# Patient Record
Sex: MALE | Race: WHITE | Employment: OTHER
[De-identification: names, ages, dates, MRNs, and addresses within clinical notes are randomized per-mention and may not be internally consistent; named-entity substitution may affect disease eponyms.]

---

## 2017-01-19 ENCOUNTER — OFFICE VISIT (OUTPATIENT)
Facility: CLINIC | Age: 77
End: 2017-01-19

## 2017-01-19 VITALS
TEMPERATURE: 97.8 F | HEIGHT: 70 IN | BODY MASS INDEX: 27.35 KG/M2 | DIASTOLIC BLOOD PRESSURE: 72 MMHG | SYSTOLIC BLOOD PRESSURE: 130 MMHG | OXYGEN SATURATION: 98 % | HEART RATE: 58 BPM | WEIGHT: 191 LBS

## 2017-01-19 DIAGNOSIS — F41.9 ANXIETY: ICD-10-CM

## 2017-01-19 DIAGNOSIS — E11.9 TYPE 2 DIABETES MELLITUS WITHOUT COMPLICATION, WITHOUT LONG-TERM CURRENT USE OF INSULIN (HCC): Primary | ICD-10-CM

## 2017-01-19 DIAGNOSIS — L30.9 DERMATITIS: ICD-10-CM

## 2017-01-19 DIAGNOSIS — Z23 NEED FOR INFLUENZA VACCINATION: ICD-10-CM

## 2017-01-19 DIAGNOSIS — Z23 NEED FOR PNEUMOCOCCAL VACCINATION: ICD-10-CM

## 2017-01-19 DIAGNOSIS — F32.A DEPRESSION, UNSPECIFIED DEPRESSION TYPE: ICD-10-CM

## 2017-01-19 DIAGNOSIS — E78.5 HYPERLIPIDEMIA, UNSPECIFIED HYPERLIPIDEMIA TYPE: ICD-10-CM

## 2017-01-19 DIAGNOSIS — I10 ESSENTIAL HYPERTENSION: ICD-10-CM

## 2017-01-19 PROCEDURE — 90662 IIV NO PRSV INCREASED AG IM: CPT | Performed by: PHYSICIAN ASSISTANT

## 2017-01-19 PROCEDURE — 90670 PCV13 VACCINE IM: CPT | Performed by: PHYSICIAN ASSISTANT

## 2017-01-19 PROCEDURE — G8484 FLU IMMUNIZE NO ADMIN: HCPCS | Performed by: PHYSICIAN ASSISTANT

## 2017-01-19 PROCEDURE — G0009 ADMIN PNEUMOCOCCAL VACCINE: HCPCS | Performed by: PHYSICIAN ASSISTANT

## 2017-01-19 PROCEDURE — 1123F ACP DISCUSS/DSCN MKR DOCD: CPT | Performed by: PHYSICIAN ASSISTANT

## 2017-01-19 PROCEDURE — 4040F PNEUMOC VAC/ADMIN/RCVD: CPT | Performed by: PHYSICIAN ASSISTANT

## 2017-01-19 PROCEDURE — 3017F COLORECTAL CA SCREEN DOC REV: CPT | Performed by: PHYSICIAN ASSISTANT

## 2017-01-19 PROCEDURE — G8420 CALC BMI NORM PARAMETERS: HCPCS | Performed by: PHYSICIAN ASSISTANT

## 2017-01-19 PROCEDURE — 99214 OFFICE O/P EST MOD 30 MIN: CPT | Performed by: PHYSICIAN ASSISTANT

## 2017-01-19 PROCEDURE — G0008 ADMIN INFLUENZA VIRUS VAC: HCPCS | Performed by: PHYSICIAN ASSISTANT

## 2017-01-19 PROCEDURE — G8427 DOCREV CUR MEDS BY ELIG CLIN: HCPCS | Performed by: PHYSICIAN ASSISTANT

## 2017-01-19 PROCEDURE — 1036F TOBACCO NON-USER: CPT | Performed by: PHYSICIAN ASSISTANT

## 2017-01-19 PROCEDURE — 3044F HG A1C LEVEL LT 7.0%: CPT | Performed by: PHYSICIAN ASSISTANT

## 2017-01-19 RX ORDER — ATENOLOL 50 MG/1
50 TABLET ORAL DAILY
Qty: 90 TABLET | Refills: 1 | Status: SHIPPED | OUTPATIENT
Start: 2017-01-19 | End: 2017-03-10 | Stop reason: SDUPTHER

## 2017-01-19 RX ORDER — LISINOPRIL 40 MG/1
40 TABLET ORAL DAILY
Qty: 90 TABLET | Refills: 1 | Status: SHIPPED | OUTPATIENT
Start: 2017-01-19 | End: 2017-04-21 | Stop reason: SDUPTHER

## 2017-01-19 RX ORDER — CLOTRIMAZOLE AND BETAMETHASONE DIPROPIONATE 10; .64 MG/G; MG/G
CREAM TOPICAL
Qty: 45 G | Refills: 0 | Status: SHIPPED | OUTPATIENT
Start: 2017-01-19 | End: 2017-11-30

## 2017-01-19 RX ORDER — METFORMIN HYDROCHLORIDE 500 MG/1
500 TABLET, EXTENDED RELEASE ORAL
Qty: 90 TABLET | Refills: 1 | Status: SHIPPED | OUTPATIENT
Start: 2017-01-19 | End: 2017-03-10 | Stop reason: SDUPTHER

## 2017-01-19 ASSESSMENT — ENCOUNTER SYMPTOMS
CHEST TIGHTNESS: 0
BLOOD IN STOOL: 0
SHORTNESS OF BREATH: 0
DIARRHEA: 0
BACK PAIN: 0
ABDOMINAL PAIN: 0
NAUSEA: 0
PHOTOPHOBIA: 0
VOMITING: 0
CONSTIPATION: 0
COUGH: 0

## 2017-01-25 ENCOUNTER — TELEPHONE (OUTPATIENT)
Facility: CLINIC | Age: 77
End: 2017-01-25

## 2017-01-25 DIAGNOSIS — L30.9 DERMATITIS: Primary | ICD-10-CM

## 2017-01-25 RX ORDER — CLOBETASOL PROPIONATE 0.5 MG/G
OINTMENT TOPICAL
Qty: 30 G | Refills: 0 | Status: SHIPPED | OUTPATIENT
Start: 2017-01-25 | End: 2017-11-30

## 2017-02-09 ENCOUNTER — TELEPHONE (OUTPATIENT)
Facility: CLINIC | Age: 77
End: 2017-02-09

## 2017-03-01 ENCOUNTER — TELEPHONE (OUTPATIENT)
Facility: CLINIC | Age: 77
End: 2017-03-01

## 2017-03-01 DIAGNOSIS — E11.9 TYPE 2 DIABETES MELLITUS WITHOUT COMPLICATION, WITHOUT LONG-TERM CURRENT USE OF INSULIN (HCC): Primary | ICD-10-CM

## 2017-03-01 RX ORDER — GLUCOSAMINE HCL/CHONDROITIN SU 500-400 MG
CAPSULE ORAL
Qty: 200 STRIP | Refills: 3 | Status: SHIPPED | OUTPATIENT
Start: 2017-03-01 | End: 2018-06-12 | Stop reason: SDUPTHER

## 2017-03-01 RX ORDER — LANCETS 30 GAUGE
EACH MISCELLANEOUS
Qty: 200 EACH | Refills: 3 | Status: SHIPPED | OUTPATIENT
Start: 2017-03-01 | End: 2018-06-12 | Stop reason: SDUPTHER

## 2017-03-10 DIAGNOSIS — I10 ESSENTIAL HYPERTENSION: ICD-10-CM

## 2017-03-10 DIAGNOSIS — E11.9 TYPE 2 DIABETES MELLITUS WITHOUT COMPLICATION, WITHOUT LONG-TERM CURRENT USE OF INSULIN (HCC): ICD-10-CM

## 2017-03-10 RX ORDER — METFORMIN HYDROCHLORIDE 500 MG/1
500 TABLET, EXTENDED RELEASE ORAL 2 TIMES DAILY WITH MEALS
Qty: 180 TABLET | Refills: 1 | Status: SHIPPED | OUTPATIENT
Start: 2017-03-10 | End: 2017-05-30 | Stop reason: DRUGHIGH

## 2017-03-10 RX ORDER — ATENOLOL 50 MG/1
50 TABLET ORAL DAILY
Qty: 90 TABLET | Refills: 1 | Status: SHIPPED | OUTPATIENT
Start: 2017-03-10 | End: 2017-08-21 | Stop reason: SDUPTHER

## 2017-05-26 RX ORDER — ATORVASTATIN CALCIUM 10 MG/1
TABLET, FILM COATED ORAL
Qty: 90 TABLET | Refills: 0 | Status: SHIPPED | OUTPATIENT
Start: 2017-05-26 | End: 2017-08-21 | Stop reason: SDUPTHER

## 2017-05-30 ENCOUNTER — HOSPITAL ENCOUNTER (OUTPATIENT)
Age: 77
Setting detail: SPECIMEN
Discharge: HOME | End: 2017-05-30
Payer: MEDICARE

## 2017-05-30 DIAGNOSIS — E11.9 TYPE 2 DIABETES MELLITUS WITHOUT COMPLICATION, WITHOUT LONG-TERM CURRENT USE OF INSULIN (HCC): ICD-10-CM

## 2017-05-30 DIAGNOSIS — Z00.00 ROUTINE GENERAL MEDICAL EXAMINATION AT A HEALTH CARE FACILITY: ICD-10-CM

## 2017-05-30 LAB
CREATININE URINE: 58.3 MG/DL (ref 39–259)
MICROALBUMIN/CREAT 24H UR: <12 MG/L
MICROALBUMIN/CREAT UR-RTO: 21 MCG/MG CREAT

## 2017-06-15 ENCOUNTER — HOSPITAL ENCOUNTER (OUTPATIENT)
Age: 77
Discharge: HOME | End: 2017-06-15
Payer: MEDICARE

## 2017-06-15 DIAGNOSIS — E11.9 TYPE 2 DIABETES MELLITUS WITHOUT COMPLICATION, WITHOUT LONG-TERM CURRENT USE OF INSULIN (HCC): ICD-10-CM

## 2017-06-15 DIAGNOSIS — Z13.6 SCREENING FOR CARDIOVASCULAR CONDITION: ICD-10-CM

## 2017-06-15 DIAGNOSIS — Z12.5 SCREENING FOR PROSTATE CANCER: ICD-10-CM

## 2017-06-15 DIAGNOSIS — Z00.00 ROUTINE GENERAL MEDICAL EXAMINATION AT A HEALTH CARE FACILITY: ICD-10-CM

## 2017-06-15 LAB
CHOLESTEROL/HDL RATIO: 3.4
CHOLESTEROL: 175 MG/DL
GLUCOSE BLD-MCNC: 92 MG/DL (ref 70–99)
HDLC SERPL-MCNC: 52 MG/DL
LDL CHOLESTEROL: 106 MG/DL (ref 0–130)
PROSTATE SPECIFIC ANTIGEN: 1.19 UG/L
TRIGL SERPL-MCNC: 86 MG/DL
VLDLC SERPL CALC-MCNC: NORMAL MG/DL (ref 1–30)

## 2017-06-15 PROCEDURE — 36415 COLL VENOUS BLD VENIPUNCTURE: CPT

## 2017-06-15 PROCEDURE — 82947 ASSAY GLUCOSE BLOOD QUANT: CPT

## 2017-06-15 PROCEDURE — 80061 LIPID PANEL: CPT

## 2017-06-15 PROCEDURE — G0103 PSA SCREENING: HCPCS

## 2018-06-12 ENCOUNTER — HOSPITAL ENCOUNTER (OUTPATIENT)
Age: 78
Setting detail: SPECIMEN
Discharge: HOME | End: 2018-06-12
Payer: MEDICARE

## 2018-06-12 DIAGNOSIS — E11.9 TYPE 2 DIABETES MELLITUS WITHOUT COMPLICATION, WITHOUT LONG-TERM CURRENT USE OF INSULIN (HCC): ICD-10-CM

## 2018-06-12 LAB
CREATININE URINE: 90.3 MG/DL (ref 39–259)
MICROALBUMIN/CREAT 24H UR: <12 MG/L
MICROALBUMIN/CREAT UR-RTO: NORMAL MCG/MG CREAT

## 2018-09-18 ENCOUNTER — HOSPITAL ENCOUNTER (OUTPATIENT)
Age: 78
Discharge: HOME | End: 2018-09-18
Payer: MEDICARE

## 2018-09-18 DIAGNOSIS — E78.5 HYPERLIPIDEMIA, UNSPECIFIED HYPERLIPIDEMIA TYPE: ICD-10-CM

## 2018-09-18 DIAGNOSIS — E11.9 TYPE 2 DIABETES MELLITUS WITHOUT COMPLICATION, WITHOUT LONG-TERM CURRENT USE OF INSULIN (HCC): ICD-10-CM

## 2018-09-18 DIAGNOSIS — Z12.5 PROSTATE CANCER SCREENING: ICD-10-CM

## 2018-09-18 DIAGNOSIS — I10 ESSENTIAL HYPERTENSION: ICD-10-CM

## 2018-09-18 LAB
ALBUMIN SERPL-MCNC: 4.7 G/DL (ref 3.5–5.2)
ALBUMIN/GLOBULIN RATIO: ABNORMAL (ref 1–2.5)
ALP BLD-CCNC: 62 U/L (ref 40–129)
ALT SERPL-CCNC: 22 U/L (ref 5–41)
ANION GAP SERPL CALCULATED.3IONS-SCNC: 11 MMOL/L (ref 9–17)
AST SERPL-CCNC: 18 U/L
BILIRUB SERPL-MCNC: 0.44 MG/DL (ref 0.3–1.2)
BUN BLDV-MCNC: 20 MG/DL (ref 8–23)
BUN/CREAT BLD: ABNORMAL (ref 9–20)
CALCIUM SERPL-MCNC: 10 MG/DL (ref 8.6–10.4)
CHLORIDE BLD-SCNC: 105 MMOL/L (ref 98–107)
CHOLESTEROL/HDL RATIO: 3.5
CHOLESTEROL: 158 MG/DL
CO2: 26 MMOL/L (ref 20–31)
CREAT SERPL-MCNC: 0.99 MG/DL (ref 0.7–1.2)
GFR AFRICAN AMERICAN: >60 ML/MIN
GFR NON-AFRICAN AMERICAN: >60 ML/MIN
GFR SERPL CREATININE-BSD FRML MDRD: ABNORMAL ML/MIN/{1.73_M2}
GFR SERPL CREATININE-BSD FRML MDRD: ABNORMAL ML/MIN/{1.73_M2}
GLUCOSE BLD-MCNC: 108 MG/DL (ref 70–99)
HCT VFR BLD CALC: 42.2 % (ref 41–53)
HDLC SERPL-MCNC: 45 MG/DL
HEMOGLOBIN: 14.3 G/DL (ref 13.5–17.5)
LDL CHOLESTEROL: 93 MG/DL (ref 0–130)
MCH RBC QN AUTO: 29.5 PG (ref 26–34)
MCHC RBC AUTO-ENTMCNC: 33.9 G/DL (ref 31–37)
MCV RBC AUTO: 86.8 FL (ref 80–100)
NRBC AUTOMATED: NORMAL PER 100 WBC
PDW BLD-RTO: 14.4 % (ref 11.5–14.9)
PLATELET # BLD: 197 K/UL (ref 150–450)
PMV BLD AUTO: 8.2 FL (ref 6–12)
POTASSIUM SERPL-SCNC: 5.4 MMOL/L (ref 3.7–5.3)
PROSTATE SPECIFIC ANTIGEN: 1.46 UG/L
RBC # BLD: 4.86 M/UL (ref 4.5–5.9)
SODIUM BLD-SCNC: 142 MMOL/L (ref 135–144)
TOTAL PROTEIN: 7.3 G/DL (ref 6.4–8.3)
TRIGL SERPL-MCNC: 102 MG/DL
VLDLC SERPL CALC-MCNC: NORMAL MG/DL (ref 1–30)
WBC # BLD: 8.2 K/UL (ref 3.5–11)

## 2018-09-18 PROCEDURE — 36415 COLL VENOUS BLD VENIPUNCTURE: CPT

## 2018-09-18 PROCEDURE — G0103 PSA SCREENING: HCPCS

## 2018-09-18 PROCEDURE — 85027 COMPLETE CBC AUTOMATED: CPT

## 2018-09-18 PROCEDURE — 80053 COMPREHEN METABOLIC PANEL: CPT

## 2018-09-18 PROCEDURE — 80061 LIPID PANEL: CPT

## 2019-06-13 ENCOUNTER — HOSPITAL ENCOUNTER (OUTPATIENT)
Age: 79
Setting detail: SPECIMEN
Discharge: HOME | End: 2019-06-13
Payer: MEDICARE

## 2019-06-13 DIAGNOSIS — E11.9 TYPE 2 DIABETES MELLITUS WITHOUT COMPLICATION, WITHOUT LONG-TERM CURRENT USE OF INSULIN (HCC): ICD-10-CM

## 2019-06-14 LAB
CREATININE URINE: 78.5 MG/DL (ref 39–259)
MICROALBUMIN/CREAT 24H UR: <12 MG/L
MICROALBUMIN/CREAT UR-RTO: NORMAL MCG/MG CREAT

## 2019-09-13 ENCOUNTER — HOSPITAL ENCOUNTER (OUTPATIENT)
Age: 79
Discharge: HOME | End: 2019-09-13
Payer: MEDICARE

## 2019-09-13 DIAGNOSIS — I10 ESSENTIAL HYPERTENSION: ICD-10-CM

## 2019-09-13 DIAGNOSIS — E78.5 HYPERLIPIDEMIA, UNSPECIFIED HYPERLIPIDEMIA TYPE: ICD-10-CM

## 2019-09-13 DIAGNOSIS — E11.9 TYPE 2 DIABETES MELLITUS WITHOUT COMPLICATION, WITHOUT LONG-TERM CURRENT USE OF INSULIN (HCC): ICD-10-CM

## 2019-09-13 DIAGNOSIS — E55.9 VITAMIN D INSUFFICIENCY: ICD-10-CM

## 2019-09-13 DIAGNOSIS — F41.9 ANXIETY: ICD-10-CM

## 2019-09-13 LAB
ABSOLUTE EOS #: 0.4 K/UL (ref 0–0.4)
ABSOLUTE IMMATURE GRANULOCYTE: ABNORMAL K/UL (ref 0–0.3)
ABSOLUTE LYMPH #: 2.6 K/UL (ref 1–4.8)
ABSOLUTE MONO #: 0.6 K/UL (ref 0.1–1.3)
ALBUMIN SERPL-MCNC: 4.7 G/DL (ref 3.5–5.2)
ALBUMIN/GLOBULIN RATIO: ABNORMAL (ref 1–2.5)
ALP BLD-CCNC: 69 U/L (ref 40–129)
ALT SERPL-CCNC: 20 U/L (ref 5–41)
ANION GAP SERPL CALCULATED.3IONS-SCNC: 12 MMOL/L (ref 9–17)
AST SERPL-CCNC: 16 U/L
BASOPHILS # BLD: 1 % (ref 0–2)
BASOPHILS ABSOLUTE: 0.1 K/UL (ref 0–0.2)
BILIRUB SERPL-MCNC: 0.48 MG/DL (ref 0.3–1.2)
BUN BLDV-MCNC: 24 MG/DL (ref 8–23)
BUN/CREAT BLD: ABNORMAL (ref 9–20)
CALCIUM SERPL-MCNC: 10.3 MG/DL (ref 8.6–10.4)
CHLORIDE BLD-SCNC: 102 MMOL/L (ref 98–107)
CHOLESTEROL/HDL RATIO: 3.6
CHOLESTEROL: 156 MG/DL
CO2: 25 MMOL/L (ref 20–31)
CREAT SERPL-MCNC: 0.96 MG/DL (ref 0.7–1.2)
DIFFERENTIAL TYPE: ABNORMAL
EOSINOPHILS RELATIVE PERCENT: 5 % (ref 0–4)
GFR AFRICAN AMERICAN: >60 ML/MIN
GFR NON-AFRICAN AMERICAN: >60 ML/MIN
GFR SERPL CREATININE-BSD FRML MDRD: ABNORMAL ML/MIN/{1.73_M2}
GFR SERPL CREATININE-BSD FRML MDRD: ABNORMAL ML/MIN/{1.73_M2}
GLUCOSE BLD-MCNC: 127 MG/DL (ref 70–99)
HCT VFR BLD CALC: 42.4 % (ref 41–53)
HDLC SERPL-MCNC: 43 MG/DL
HEMOGLOBIN: 14.4 G/DL (ref 13.5–17.5)
IMMATURE GRANULOCYTES: ABNORMAL %
LDL CHOLESTEROL: 92 MG/DL (ref 0–130)
LYMPHOCYTES # BLD: 29 % (ref 24–44)
MCH RBC QN AUTO: 29.7 PG (ref 26–34)
MCHC RBC AUTO-ENTMCNC: 34.1 G/DL (ref 31–37)
MCV RBC AUTO: 87.3 FL (ref 80–100)
MONOCYTES # BLD: 7 % (ref 1–7)
NRBC AUTOMATED: ABNORMAL PER 100 WBC
PDW BLD-RTO: 13.6 % (ref 11.5–14.9)
PLATELET # BLD: 217 K/UL (ref 150–450)
PLATELET ESTIMATE: ABNORMAL
PMV BLD AUTO: 8.3 FL (ref 6–12)
POTASSIUM SERPL-SCNC: 4.5 MMOL/L (ref 3.7–5.3)
RBC # BLD: 4.86 M/UL (ref 4.5–5.9)
RBC # BLD: ABNORMAL 10*6/UL
SEG NEUTROPHILS: 58 % (ref 36–66)
SEGMENTED NEUTROPHILS ABSOLUTE COUNT: 5.2 K/UL (ref 1.3–9.1)
SODIUM BLD-SCNC: 139 MMOL/L (ref 135–144)
TOTAL PROTEIN: 7.6 G/DL (ref 6.4–8.3)
TRIGL SERPL-MCNC: 103 MG/DL
VITAMIN D 25-HYDROXY: 39.9 NG/ML (ref 30–100)
VLDLC SERPL CALC-MCNC: NORMAL MG/DL (ref 1–30)
WBC # BLD: 9 K/UL (ref 3.5–11)
WBC # BLD: ABNORMAL 10*3/UL

## 2019-09-13 PROCEDURE — 80061 LIPID PANEL: CPT

## 2019-09-13 PROCEDURE — 36415 COLL VENOUS BLD VENIPUNCTURE: CPT

## 2019-09-13 PROCEDURE — 85025 COMPLETE CBC W/AUTO DIFF WBC: CPT

## 2019-09-13 PROCEDURE — 80053 COMPREHEN METABOLIC PANEL: CPT

## 2019-09-13 PROCEDURE — 82306 VITAMIN D 25 HYDROXY: CPT

## 2019-12-24 ENCOUNTER — HOSPITAL ENCOUNTER (OUTPATIENT)
Age: 79
Discharge: HOME | End: 2019-12-26
Payer: MEDICARE

## 2019-12-24 ENCOUNTER — HOSPITAL ENCOUNTER (OUTPATIENT)
Dept: GENERAL RADIOLOGY | Age: 79
Discharge: HOME | End: 2019-12-26
Payer: MEDICARE

## 2019-12-24 DIAGNOSIS — M25.432 PAIN AND SWELLING OF LEFT WRIST: ICD-10-CM

## 2019-12-24 DIAGNOSIS — M25.532 PAIN AND SWELLING OF LEFT WRIST: ICD-10-CM

## 2019-12-24 PROCEDURE — 73110 X-RAY EXAM OF WRIST: CPT

## 2020-09-29 ENCOUNTER — HOSPITAL ENCOUNTER (OUTPATIENT)
Age: 80
Discharge: HOME | End: 2020-09-29
Payer: MEDICARE

## 2020-09-29 LAB
ABSOLUTE EOS #: 0.4 K/UL (ref 0–0.4)
ABSOLUTE IMMATURE GRANULOCYTE: ABNORMAL K/UL (ref 0–0.3)
ABSOLUTE LYMPH #: 2.3 K/UL (ref 1–4.8)
ABSOLUTE MONO #: 0.5 K/UL (ref 0.1–1.3)
ALBUMIN SERPL-MCNC: 4.6 G/DL (ref 3.5–5.2)
ALBUMIN/GLOBULIN RATIO: ABNORMAL (ref 1–2.5)
ALP BLD-CCNC: 68 U/L (ref 40–129)
ALT SERPL-CCNC: 20 U/L (ref 5–41)
ANION GAP SERPL CALCULATED.3IONS-SCNC: 10 MMOL/L (ref 9–17)
AST SERPL-CCNC: 18 U/L
BASOPHILS # BLD: 1 % (ref 0–2)
BASOPHILS ABSOLUTE: 0.1 K/UL (ref 0–0.2)
BILIRUB SERPL-MCNC: 0.46 MG/DL (ref 0.3–1.2)
BUN BLDV-MCNC: 18 MG/DL (ref 8–23)
BUN/CREAT BLD: ABNORMAL (ref 9–20)
CALCIUM SERPL-MCNC: 10.3 MG/DL (ref 8.6–10.4)
CHLORIDE BLD-SCNC: 107 MMOL/L (ref 98–107)
CHOLESTEROL/HDL RATIO: 3.7
CHOLESTEROL: 156 MG/DL
CO2: 26 MMOL/L (ref 20–31)
CREAT SERPL-MCNC: 0.87 MG/DL (ref 0.7–1.2)
DIFFERENTIAL TYPE: ABNORMAL
EOSINOPHILS RELATIVE PERCENT: 5 % (ref 0–4)
ESTIMATED AVERAGE GLUCOSE: 157 MG/DL
GFR AFRICAN AMERICAN: >60 ML/MIN
GFR NON-AFRICAN AMERICAN: >60 ML/MIN
GFR SERPL CREATININE-BSD FRML MDRD: ABNORMAL ML/MIN/{1.73_M2}
GFR SERPL CREATININE-BSD FRML MDRD: ABNORMAL ML/MIN/{1.73_M2}
GLUCOSE BLD-MCNC: 121 MG/DL (ref 70–99)
HBA1C MFR BLD: 7.1 % (ref 4–6)
HCT VFR BLD CALC: 44.4 % (ref 41–53)
HDLC SERPL-MCNC: 42 MG/DL
HEMOGLOBIN: 15 G/DL (ref 13.5–17.5)
IMMATURE GRANULOCYTES: ABNORMAL %
LDL CHOLESTEROL: 91 MG/DL (ref 0–130)
LYMPHOCYTES # BLD: 29 % (ref 24–44)
MCH RBC QN AUTO: 29.2 PG (ref 26–34)
MCHC RBC AUTO-ENTMCNC: 33.7 G/DL (ref 31–37)
MCV RBC AUTO: 86.4 FL (ref 80–100)
MONOCYTES # BLD: 7 % (ref 1–7)
NRBC AUTOMATED: ABNORMAL PER 100 WBC
PDW BLD-RTO: 14.1 % (ref 11.5–14.9)
PLATELET # BLD: 228 K/UL (ref 150–450)
PLATELET ESTIMATE: ABNORMAL
PMV BLD AUTO: 8.4 FL (ref 6–12)
POTASSIUM SERPL-SCNC: 4.8 MMOL/L (ref 3.7–5.3)
PROSTATE SPECIFIC ANTIGEN: 1.74 UG/L
RBC # BLD: 5.14 M/UL (ref 4.5–5.9)
RBC # BLD: ABNORMAL 10*6/UL
SEG NEUTROPHILS: 58 % (ref 36–66)
SEGMENTED NEUTROPHILS ABSOLUTE COUNT: 4.5 K/UL (ref 1.3–9.1)
SODIUM BLD-SCNC: 143 MMOL/L (ref 135–144)
TOTAL PROTEIN: 7.5 G/DL (ref 6.4–8.3)
TRIGL SERPL-MCNC: 115 MG/DL
VITAMIN D 25-HYDROXY: 36.7 NG/ML (ref 30–100)
VLDLC SERPL CALC-MCNC: NORMAL MG/DL (ref 1–30)
WBC # BLD: 7.8 K/UL (ref 3.5–11)
WBC # BLD: ABNORMAL 10*3/UL

## 2020-09-29 PROCEDURE — 85025 COMPLETE CBC W/AUTO DIFF WBC: CPT

## 2020-09-29 PROCEDURE — 83036 HEMOGLOBIN GLYCOSYLATED A1C: CPT

## 2020-09-29 PROCEDURE — 80053 COMPREHEN METABOLIC PANEL: CPT

## 2020-09-29 PROCEDURE — 80061 LIPID PANEL: CPT

## 2020-09-29 PROCEDURE — 82306 VITAMIN D 25 HYDROXY: CPT

## 2020-09-29 PROCEDURE — 36415 COLL VENOUS BLD VENIPUNCTURE: CPT

## 2020-09-29 PROCEDURE — G0103 PSA SCREENING: HCPCS

## 2021-03-10 PROBLEM — F33.0 MILD EPISODE OF RECURRENT MAJOR DEPRESSIVE DISORDER (HCC): Status: ACTIVE | Noted: 2021-03-10

## 2021-04-13 ENCOUNTER — HOSPITAL ENCOUNTER (OUTPATIENT)
Age: 81
Discharge: HOME | End: 2021-04-13
Payer: MEDICARE

## 2021-04-13 DIAGNOSIS — Z11.59 ENCOUNTER FOR HEPATITIS C SCREENING TEST FOR LOW RISK PATIENT: ICD-10-CM

## 2021-04-13 DIAGNOSIS — F33.0 MILD EPISODE OF RECURRENT MAJOR DEPRESSIVE DISORDER (HCC): ICD-10-CM

## 2021-04-13 DIAGNOSIS — E55.9 VITAMIN D INSUFFICIENCY: ICD-10-CM

## 2021-04-13 DIAGNOSIS — E78.5 HYPERLIPIDEMIA, UNSPECIFIED HYPERLIPIDEMIA TYPE: ICD-10-CM

## 2021-04-13 DIAGNOSIS — I10 ESSENTIAL HYPERTENSION: ICD-10-CM

## 2021-04-13 LAB
ABSOLUTE EOS #: 0.4 K/UL (ref 0–0.4)
ABSOLUTE IMMATURE GRANULOCYTE: ABNORMAL K/UL (ref 0–0.3)
ABSOLUTE LYMPH #: 2.6 K/UL (ref 1–4.8)
ABSOLUTE MONO #: 0.5 K/UL (ref 0.1–1.3)
ALBUMIN SERPL-MCNC: 4.7 G/DL (ref 3.5–5.2)
ALBUMIN/GLOBULIN RATIO: ABNORMAL (ref 1–2.5)
ALP BLD-CCNC: 69 U/L (ref 40–129)
ALT SERPL-CCNC: 21 U/L (ref 5–41)
ANION GAP SERPL CALCULATED.3IONS-SCNC: 8 MMOL/L (ref 9–17)
AST SERPL-CCNC: 20 U/L
BASOPHILS # BLD: 1 % (ref 0–2)
BASOPHILS ABSOLUTE: 0.1 K/UL (ref 0–0.2)
BILIRUB SERPL-MCNC: 0.46 MG/DL (ref 0.3–1.2)
BUN BLDV-MCNC: 20 MG/DL (ref 8–23)
BUN/CREAT BLD: ABNORMAL (ref 9–20)
CALCIUM SERPL-MCNC: 10.2 MG/DL (ref 8.6–10.4)
CHLORIDE BLD-SCNC: 106 MMOL/L (ref 98–107)
CHOLESTEROL, FASTING: 163 MG/DL
CHOLESTEROL/HDL RATIO: 3.5
CO2: 28 MMOL/L (ref 20–31)
CREAT SERPL-MCNC: 1.03 MG/DL (ref 0.7–1.2)
DIFFERENTIAL TYPE: ABNORMAL
EOSINOPHILS RELATIVE PERCENT: 5 % (ref 0–4)
GFR AFRICAN AMERICAN: >60 ML/MIN
GFR NON-AFRICAN AMERICAN: >60 ML/MIN
GFR SERPL CREATININE-BSD FRML MDRD: ABNORMAL ML/MIN/{1.73_M2}
GFR SERPL CREATININE-BSD FRML MDRD: ABNORMAL ML/MIN/{1.73_M2}
GLUCOSE BLD-MCNC: 128 MG/DL (ref 70–99)
HCT VFR BLD CALC: 42.8 % (ref 41–53)
HDLC SERPL-MCNC: 46 MG/DL
HEMOGLOBIN: 14.5 G/DL (ref 13.5–17.5)
HEPATITIS C ANTIBODY: NONREACTIVE
IMMATURE GRANULOCYTES: ABNORMAL %
LDL CHOLESTEROL: 96 MG/DL (ref 0–130)
LYMPHOCYTES # BLD: 31 % (ref 24–44)
MCH RBC QN AUTO: 29.6 PG (ref 26–34)
MCHC RBC AUTO-ENTMCNC: 33.8 G/DL (ref 31–37)
MCV RBC AUTO: 87.4 FL (ref 80–100)
MONOCYTES # BLD: 6 % (ref 1–7)
NRBC AUTOMATED: ABNORMAL PER 100 WBC
PDW BLD-RTO: 13.8 % (ref 11.5–14.9)
PLATELET # BLD: 235 K/UL (ref 150–450)
PLATELET ESTIMATE: ABNORMAL
PMV BLD AUTO: 8.4 FL (ref 6–12)
POTASSIUM SERPL-SCNC: 5 MMOL/L (ref 3.7–5.3)
RBC # BLD: 4.89 M/UL (ref 4.5–5.9)
RBC # BLD: ABNORMAL 10*6/UL
SEG NEUTROPHILS: 57 % (ref 36–66)
SEGMENTED NEUTROPHILS ABSOLUTE COUNT: 4.9 K/UL (ref 1.3–9.1)
SODIUM BLD-SCNC: 142 MMOL/L (ref 135–144)
TOTAL PROTEIN: 7.4 G/DL (ref 6.4–8.3)
TRIGLYCERIDE, FASTING: 107 MG/DL
VITAMIN D 25-HYDROXY: 37.7 NG/ML (ref 30–100)
VLDLC SERPL CALC-MCNC: NORMAL MG/DL (ref 1–30)
WBC # BLD: 8.5 K/UL (ref 3.5–11)
WBC # BLD: ABNORMAL 10*3/UL

## 2021-04-13 PROCEDURE — 85025 COMPLETE CBC W/AUTO DIFF WBC: CPT

## 2021-04-13 PROCEDURE — 36415 COLL VENOUS BLD VENIPUNCTURE: CPT

## 2021-04-13 PROCEDURE — 86803 HEPATITIS C AB TEST: CPT

## 2021-04-13 PROCEDURE — 80053 COMPREHEN METABOLIC PANEL: CPT

## 2021-04-13 PROCEDURE — 82306 VITAMIN D 25 HYDROXY: CPT

## 2021-04-13 PROCEDURE — 80061 LIPID PANEL: CPT

## 2022-03-31 ENCOUNTER — HOSPITAL ENCOUNTER (OUTPATIENT)
Age: 82
Discharge: HOME | End: 2022-03-31
Payer: MEDICARE

## 2022-03-31 DIAGNOSIS — Z12.5 PROSTATE CANCER SCREENING: ICD-10-CM

## 2022-03-31 DIAGNOSIS — I10 ESSENTIAL HYPERTENSION: ICD-10-CM

## 2022-03-31 DIAGNOSIS — F33.0 MILD EPISODE OF RECURRENT MAJOR DEPRESSIVE DISORDER (HCC): ICD-10-CM

## 2022-03-31 DIAGNOSIS — E78.5 HYPERLIPIDEMIA, UNSPECIFIED HYPERLIPIDEMIA TYPE: ICD-10-CM

## 2022-03-31 DIAGNOSIS — E11.9 TYPE 2 DIABETES MELLITUS WITHOUT COMPLICATION, WITHOUT LONG-TERM CURRENT USE OF INSULIN (HCC): ICD-10-CM

## 2022-03-31 LAB
ABSOLUTE EOS #: 0.3 K/UL (ref 0–0.4)
ABSOLUTE LYMPH #: 2.6 K/UL (ref 1–4.8)
ABSOLUTE MONO #: 0.6 K/UL (ref 0.1–1.3)
ALBUMIN SERPL-MCNC: 4.3 G/DL (ref 3.5–5.2)
ALP BLD-CCNC: 78 U/L (ref 40–129)
ALT SERPL-CCNC: 20 U/L (ref 5–41)
ANION GAP SERPL CALCULATED.3IONS-SCNC: 13 MMOL/L (ref 9–17)
AST SERPL-CCNC: 18 U/L
BASOPHILS # BLD: 1 % (ref 0–2)
BASOPHILS ABSOLUTE: 0.1 K/UL (ref 0–0.2)
BILIRUB SERPL-MCNC: 0.46 MG/DL (ref 0.3–1.2)
BUN BLDV-MCNC: 23 MG/DL (ref 8–23)
CALCIUM SERPL-MCNC: 10.2 MG/DL (ref 8.6–10.4)
CHLORIDE BLD-SCNC: 103 MMOL/L (ref 98–107)
CHOLESTEROL/HDL RATIO: 3.7
CHOLESTEROL: 166 MG/DL
CO2: 23 MMOL/L (ref 20–31)
CREAT SERPL-MCNC: 0.96 MG/DL (ref 0.7–1.2)
EOSINOPHILS RELATIVE PERCENT: 4 % (ref 0–4)
ESTIMATED AVERAGE GLUCOSE: 169 MG/DL
GFR AFRICAN AMERICAN: >60 ML/MIN
GFR NON-AFRICAN AMERICAN: >60 ML/MIN
GFR SERPL CREATININE-BSD FRML MDRD: ABNORMAL ML/MIN/{1.73_M2}
GLUCOSE BLD-MCNC: 122 MG/DL (ref 70–99)
HBA1C MFR BLD: 7.5 % (ref 4–6)
HCT VFR BLD CALC: 42.1 % (ref 41–53)
HDLC SERPL-MCNC: 45 MG/DL
HEMOGLOBIN: 14.2 G/DL (ref 13.5–17.5)
LDL CHOLESTEROL: 102 MG/DL (ref 0–130)
LYMPHOCYTES # BLD: 28 % (ref 24–44)
MCH RBC QN AUTO: 29.4 PG (ref 26–34)
MCHC RBC AUTO-ENTMCNC: 33.8 G/DL (ref 31–37)
MCV RBC AUTO: 86.9 FL (ref 80–100)
MONOCYTES # BLD: 7 % (ref 1–7)
PDW BLD-RTO: 13.5 % (ref 11.5–14.9)
PLATELET # BLD: 270 K/UL (ref 150–450)
PMV BLD AUTO: 7.9 FL (ref 6–12)
POTASSIUM SERPL-SCNC: 4.7 MMOL/L (ref 3.7–5.3)
PROSTATE SPECIFIC ANTIGEN: 2.02 UG/L
RBC # BLD: 4.84 M/UL (ref 4.5–5.9)
SEG NEUTROPHILS: 60 % (ref 36–66)
SEGMENTED NEUTROPHILS ABSOLUTE COUNT: 5.8 K/UL (ref 1.3–9.1)
SODIUM BLD-SCNC: 139 MMOL/L (ref 135–144)
TOTAL PROTEIN: 7.5 G/DL (ref 6.4–8.3)
TRIGL SERPL-MCNC: 94 MG/DL
WBC # BLD: 9.4 K/UL (ref 3.5–11)

## 2022-03-31 PROCEDURE — 36415 COLL VENOUS BLD VENIPUNCTURE: CPT

## 2022-03-31 PROCEDURE — 85025 COMPLETE CBC W/AUTO DIFF WBC: CPT

## 2022-03-31 PROCEDURE — 83036 HEMOGLOBIN GLYCOSYLATED A1C: CPT

## 2022-03-31 PROCEDURE — 80053 COMPREHEN METABOLIC PANEL: CPT

## 2022-03-31 PROCEDURE — G0103 PSA SCREENING: HCPCS

## 2022-03-31 PROCEDURE — 80061 LIPID PANEL: CPT

## 2022-12-20 SDOH — HEALTH STABILITY: PHYSICAL HEALTH: ON AVERAGE, HOW MANY MINUTES DO YOU ENGAGE IN EXERCISE AT THIS LEVEL?: 60 MIN

## 2022-12-20 SDOH — HEALTH STABILITY: PHYSICAL HEALTH: ON AVERAGE, HOW MANY DAYS PER WEEK DO YOU ENGAGE IN MODERATE TO STRENUOUS EXERCISE (LIKE A BRISK WALK)?: 3 DAYS

## 2022-12-21 ENCOUNTER — OFFICE VISIT (OUTPATIENT)
Dept: FAMILY MEDICINE CLINIC | Age: 82
End: 2022-12-21
Payer: MEDICARE

## 2022-12-21 VITALS
BODY MASS INDEX: 27.57 KG/M2 | SYSTOLIC BLOOD PRESSURE: 154 MMHG | WEIGHT: 192.6 LBS | HEART RATE: 61 BPM | HEIGHT: 70 IN | TEMPERATURE: 97.6 F | DIASTOLIC BLOOD PRESSURE: 88 MMHG | OXYGEN SATURATION: 97 %

## 2022-12-21 DIAGNOSIS — F33.0 MILD EPISODE OF RECURRENT MAJOR DEPRESSIVE DISORDER (HCC): ICD-10-CM

## 2022-12-21 DIAGNOSIS — I10 ESSENTIAL HYPERTENSION: ICD-10-CM

## 2022-12-21 DIAGNOSIS — R33.9 INCOMPLETE BLADDER EMPTYING: ICD-10-CM

## 2022-12-21 DIAGNOSIS — E78.2 MIXED HYPERLIPIDEMIA: ICD-10-CM

## 2022-12-21 DIAGNOSIS — F41.9 ANXIETY: ICD-10-CM

## 2022-12-21 DIAGNOSIS — E11.9 TYPE 2 DIABETES MELLITUS WITHOUT COMPLICATION, WITHOUT LONG-TERM CURRENT USE OF INSULIN (HCC): Primary | ICD-10-CM

## 2022-12-21 PROCEDURE — 3078F DIAST BP <80 MM HG: CPT | Performed by: FAMILY MEDICINE

## 2022-12-21 PROCEDURE — G8427 DOCREV CUR MEDS BY ELIG CLIN: HCPCS | Performed by: FAMILY MEDICINE

## 2022-12-21 PROCEDURE — 1123F ACP DISCUSS/DSCN MKR DOCD: CPT | Performed by: FAMILY MEDICINE

## 2022-12-21 PROCEDURE — 99214 OFFICE O/P EST MOD 30 MIN: CPT | Performed by: FAMILY MEDICINE

## 2022-12-21 PROCEDURE — 3051F HG A1C>EQUAL 7.0%<8.0%: CPT | Performed by: FAMILY MEDICINE

## 2022-12-21 PROCEDURE — G8417 CALC BMI ABV UP PARAM F/U: HCPCS | Performed by: FAMILY MEDICINE

## 2022-12-21 PROCEDURE — 3074F SYST BP LT 130 MM HG: CPT | Performed by: FAMILY MEDICINE

## 2022-12-21 PROCEDURE — G8484 FLU IMMUNIZE NO ADMIN: HCPCS | Performed by: FAMILY MEDICINE

## 2022-12-21 PROCEDURE — 1036F TOBACCO NON-USER: CPT | Performed by: FAMILY MEDICINE

## 2022-12-21 RX ORDER — ATENOLOL 50 MG/1
50 TABLET ORAL
Qty: 90 TABLET | Refills: 3 | Status: SHIPPED | OUTPATIENT
Start: 2022-12-21

## 2022-12-21 RX ORDER — ATORVASTATIN CALCIUM 10 MG/1
10 TABLET, FILM COATED ORAL
Qty: 90 TABLET | Refills: 3 | Status: SHIPPED | OUTPATIENT
Start: 2022-12-21

## 2022-12-21 RX ORDER — TAMSULOSIN HYDROCHLORIDE 0.4 MG/1
0.4 CAPSULE ORAL
Qty: 90 CAPSULE | Refills: 1 | Status: SHIPPED | OUTPATIENT
Start: 2022-12-21

## 2022-12-21 RX ORDER — LISINOPRIL 40 MG/1
40 TABLET ORAL EVERY MORNING
Qty: 90 TABLET | Refills: 3 | Status: SHIPPED | OUTPATIENT
Start: 2022-12-21

## 2022-12-21 ASSESSMENT — PATIENT HEALTH QUESTIONNAIRE - PHQ9
SUM OF ALL RESPONSES TO PHQ QUESTIONS 1-9: 6
2. FEELING DOWN, DEPRESSED OR HOPELESS: 0
5. POOR APPETITE OR OVEREATING: 0
SUM OF ALL RESPONSES TO PHQ QUESTIONS 1-9: 6
6. FEELING BAD ABOUT YOURSELF - OR THAT YOU ARE A FAILURE OR HAVE LET YOURSELF OR YOUR FAMILY DOWN: 0
9. THOUGHTS THAT YOU WOULD BE BETTER OFF DEAD, OR OF HURTING YOURSELF: 0
SUM OF ALL RESPONSES TO PHQ QUESTIONS 1-9: 6
8. MOVING OR SPEAKING SO SLOWLY THAT OTHER PEOPLE COULD HAVE NOTICED. OR THE OPPOSITE, BEING SO FIGETY OR RESTLESS THAT YOU HAVE BEEN MOVING AROUND A LOT MORE THAN USUAL: 0
SUM OF ALL RESPONSES TO PHQ QUESTIONS 1-9: 6
7. TROUBLE CONCENTRATING ON THINGS, SUCH AS READING THE NEWSPAPER OR WATCHING TELEVISION: 0
3. TROUBLE FALLING OR STAYING ASLEEP: 3
4. FEELING TIRED OR HAVING LITTLE ENERGY: 3
1. LITTLE INTEREST OR PLEASURE IN DOING THINGS: 0
SUM OF ALL RESPONSES TO PHQ9 QUESTIONS 1 & 2: 0
10. IF YOU CHECKED OFF ANY PROBLEMS, HOW DIFFICULT HAVE THESE PROBLEMS MADE IT FOR YOU TO DO YOUR WORK, TAKE CARE OF THINGS AT HOME, OR GET ALONG WITH OTHER PEOPLE: 0

## 2022-12-21 ASSESSMENT — ENCOUNTER SYMPTOMS
SHORTNESS OF BREATH: 0
COUGH: 0
VOMITING: 0
CHEST TIGHTNESS: 0
NAUSEA: 0
CONSTIPATION: 0
DIARRHEA: 0
WHEEZING: 0
ABDOMINAL PAIN: 0
ABDOMINAL DISTENTION: 0

## 2022-12-21 ASSESSMENT — VISUAL ACUITY
OD_CC: 20/25
OS_CC: 20/25

## 2022-12-21 NOTE — PROGRESS NOTES
Visit Information    Have you changed or started any medications since your last visit including any over-the-counter medicines, vitamins, or herbal medicines? no   Have you stopped taking any of your medications? Is so, why? -  no  Are you having any side effects from any of your medications? - no    Have you seen any other physician or provider since your last visit?  no   Have you had any other diagnostic tests since your last visit?  no   Have you been seen in the emergency room and/or had an admission in a hospital since we last saw you?  no   Have you had your routine dental cleaning in the past 6 months?  yes -      Do you have an active MyChart account? If no, what is the barrier?   Yes    Patient Care Team:  Nathalie Hutton MD as PCP - General (Family Medicine)  Alice Rivero MD as PCP - [facility]    Medical History Review  Past Medical, Family, and Social History reviewed and does contribute to the patient presenting condition    Health Maintenance   Topic Date Due    Shingles vaccine (1 of 2) Never done    Annual Wellness Visit (AWV)  04/07/2022    Lipids  03/31/2023    Depression Monitoring  04/06/2023    DTaP/Tdap/Td vaccine (2 - Td or Tdap) 04/18/2026    Flu vaccine  Completed    Pneumococcal 65+ years Vaccine  Completed    COVID-19 Vaccine  Completed    Hepatitis A vaccine  Aged Out    Hib vaccine  Aged Out    Meningococcal (ACWY) vaccine  Aged Out

## 2022-12-21 NOTE — PROGRESS NOTES
Jeremy Morelos (:  1940) is a [de-identified] y.o. male,New patient, here for evaluation of the following chief complaint(s): Establish Care, Diabetes, Hypertension, Hyperlipidemia, and Anxiety. Prior PCP was in [facility]. ASSESSMENT/PLAN:    1. Type 2 diabetes mellitus without complication, without long-term current use of insulin (HCC)  Worsening, but well controlled  Check labs  Stop Amaryl 4 mg due to high risk of hypoglycemia at his age, he understands  Lab Results   Component Value Date    LABA1C 7.5 (H) 2022    LABA1C 6.7 2021    LABA1C 7.1 (H) 2020       -     metFORMIN (GLUCOPHAGE) 1000 MG tablet; Take 1 tablet by mouth 2 times daily (with meals) Keep Blood Glucose , Disp-180 tablet, R-0Normal  -     EKG 12 Lead; Future  -     CBC; Future  -     Comprehensive Metabolic Panel; Future  -     Hemoglobin A1C; Future  -     Vitamin B12 & Folate; Future  2. Essential hypertension  Inadequately controlled  Discussed low salt diet and BP and pulse monitoring. Will continue the same medications, check labs  Needs nurse visit appointment BP check in 1 week. I could not find any EKG in his chart  -     EKG 12 Lead; Future  -     lisinopril (PRINIVIL;ZESTRIL) 40 MG tablet; Take 1 tablet by mouth every morning, Disp-90 tablet, R-3Normal  -     atenolol (TENORMIN) 50 MG tablet; Take 1 tablet by mouth Daily with supper Goal BP bellow 135/85 and above 115/65, heart rate 56 to 90 bpm, Disp-90 tablet, R-3Normal  -     CBC; Future  -     Comprehensive Metabolic Panel; Future  -     Uric Acid; Future  -     TSH; Future  -     Magnesium; Future  -     Urinalysis with Reflex to Culture; Future  3. Mixed hyperlipidemia  Inadequately controlled  Check lipid panel  Continue Lipitor 10 Mg daily  Lab Results   Component Value Date    LDLCHOLESTEROL 102 2022       -     EKG 12 Lead; Future  -     Lipid Panel; Future  4.  Mild episode of recurrent major depressive disorder ([address]  Improved with medication  Will continue to monitor, continue Zoloft  -     sertraline (ZOLOFT) 50 MG tablet; Take 1 tablet by mouth every evening, Disp-90 tablet, R-3Normal  5. Anxiety  Improved with medication, continue to monitor, continue Zoloft  -     sertraline (ZOLOFT) 50 MG tablet; Take 1 tablet by mouth every evening, Disp-90 tablet, R-3Normal  6. Incomplete bladder emptying with frequency and nocturia  Improved with medication  Continue current treatment  Declines referral to urologist  Will rule out UTI  -     tamsulosin (FLOMAX) 0.4 MG capsule; Take 1 capsule by mouth Daily with supper For incomplete bladder emptying, Disp-90 capsule, R-1Normal    Jon received counseling on the following healthy behaviors: nutrition, weight loss, medication compliance, falls precautions. Reviewed prior labs and health maintenance  Discussed use, benefit, and side effects of prescribed medications. Barriers to medication compliance addressed. Patient given educational materials - see patient instructions  All patient questions answered. Patient voiced understanding. The patient's past medical,surgical, social, and family history as well as his current medications and allergies were reviewed as documented in today's encounter. Medications, labs, diagnostic studies, consultations and follow-up as documented in this encounter. Return in about 4 months (around 4/21/2023) for Visit type MEDICARE, [facility], [address] [place] [address] [facility] Needs nurse visit appointment BP check in 1 week. Future Appointments   Date Time Provider Forrest Dailey   12/30/2022 11:00 AM [facility]EDU[facility]   4/27/2023  8:00 AM Matthew Maxwell MD [facility]           Prior to Visit Medications    Medication Sig Taking?  Authorizing Provider   atenolol (TENORMIN) 50 MG tablet TAKE 1 TABLET BY MOUTH EVERY DAY Yes Christina Allen MD   atorvastatin (LIPITOR) 10 MG tablet TAKE 1 TABLET BY MOUTH EVERY DAY Yes Michelle Boykin MD Zhanna   glimepiride (AMARYL) 4 MG tablet TAKE 1 TABLET BY MOUTH IN THE MORNING BEFORE BREAKFAST Yes John Mensah MD   lisinopril (PRINIVIL;ZESTRIL) 40 MG tablet TAKE 1 TABLET BY MOUTH EVERY DAY Yes John Mensah MD   metFORMIN (GLUCOPHAGE) 1000 MG tablet TAKE 1 TABLET BY MOUTH TWO TIMES A DAY WITH FOOD Yes John Mensah MD   sertraline (ZOLOFT) 50 MG tablet TAKE 1 TABLET BY MOUTH EVERY DAY Yes Nam Rivera PA-C   blood glucose test strips (TRUE METRIX BLOOD GLUCOSE TEST) strip USE ONE TEST STRIP TO TEST BLOOD SUGAR TWO TIMES A DAY Yes Nam Rivera PA-C   TRUEplus Lancets 33G MISC test blood sugar 2 times a day Yes Nam Rivera PA-C       No Known Allergies    Past Medical History:   Diagnosis Date    Anxiety     Hyperlipidemia     Hypertension     Type 2 diabetes mellitus ([facility] [address]        Past Surgical History:   Procedure Laterality Date    SKIN CANCER EXCISION      Squamous Cell Carcinoma    WISDOM TOOTH EXTRACTION      all 4 extracted       Family History   Problem Relation Age of Onset    Stroke Mother     Cancer Mother         breast    Other Mother         parkinson's disease    Breast Cancer Mother     Other Father         emphysema    Heart Disease Father         MI    Alcohol Abuse Father     Breast Cancer Sister        Social History     Tobacco Use    Smoking status: Never    Smokeless tobacco: Never   Substance Use Topics    Alcohol use: Never    Drug use: No         SUBJECTIVE/OBJECTIVE:    Prior PCP: Nam Rivera. PCP left the practice, and he needed to establish care with another PCP. Comes with his wife, Vijay. He is hard of hearing. Has known diabetes mellitus type 2    Home Blood Glucose 102,85, checking daily  Takes Glimepiride  and metformin    A1c worsening but well controlled. He is up-to-date with eye exam per his report.   Lab Results   Component Value Date    LABA1C 7.5 (H) 03/31/2022    LABA1C 6.7 04/06/2021    LABA1C 7.1 (H) 09/29/2020       Weight is stable  Wt Readings from Last 3 Encounters:   12/21/22 192 lb 9.6 oz (87.4 kg)   04/06/22 190 lb 9.6 oz (86.5 kg)   09/03/21 194 lb (88 kg)       Hypertension:    he  is not exercising, but staying active, and is adherent to low salt diet. Blood pressure is well controlled at home. Cardiac symptoms fatigue, getting tired easily. Has been taking a nap when playing at the computer. Going to GYM with his wife, which has been helping with the fatigue. He does not remember if he has had an EKG, his wife says he might have had an EKG many years ago. Patient denies chest pain, chest pressure/discomfort, claudication, dyspnea, exertional chest pressure/discomfort, irregular heart beat, lower extremity edema, near-syncope, orthopnea, palpitations, paroxysmal nocturnal dyspnea, syncope, and tachypnea. Cardiovascular risk factors: advanced age (older than 54 for men, 72 for women), diabetes mellitus, dyslipidemia, hypertension, and male gender. Use of agents associated with hypertension: none. History of target organ damage: none. Patient says he has never seen a cardiologist.  Has never had any cardiac issues in his life. Blood pressure borderline high but previously normal.  His wife says his blood pressure is well controlled at home. Patient says he has whitecoat syndrome and he is anxious today. blood pressure is Elevated. BP Readings from Last 3 Encounters:   12/21/22 (!) 154/88   04/06/22 110/64   09/03/21 128/70        Pulse is Normal.    Pulse Readings from Last 3 Encounters:   12/21/22 61   04/06/22 61   09/03/21 64     Hyperlipidemia:  No new myalgias or GI upset on atorvastatin (Lipitor). Medication compliance: compliant all of the time. Patient is  following a low fat, low cholesterol diet.     LDL is high  Lab Results   Component Value Date    LDLCHOLESTEROL 102 03/31/2022     Lab Results   Component Value Date    TRIG 94 03/31/2022    TRIG 115 09/29/2020    TRIG 103 09/13/2019       Anxiety and depression  Has been taking Zoloft, tolerated well, denies side effects, patient reports depression and anxiety have been better since on Zoloft. PHQ-2 Over the past 2 weeks, how often have you been bothered by any of the following problems? Little interest or pleasure in doing things: Not at all  Feeling down, depressed, or hopeless: Not at all  PHQ-2 Score: 0  PHQ-9 Over the past 2 weeks, how often have you been bothered by any of the following problems? Trouble falling or staying asleep, or sleeping too much: Nearly every day  Feeling tired or having little energy: Nearly every day  Poor appetite or overeating: Not at all  Feeling bad about yourself - or that you are a failure or have let yourself or your family down: Not at all  Trouble concentrating on things, such as reading the newspaper or watching television: Not at all  Moving or speaking so slowly that other people could have noticed. Or the opposite - being so fidgety or restless that you have been moving around a lot more than usual: Not at all  Thoughts that you would be better off dead, or of hurting yourself in some way: Not at all  If you checked off any problems, how difficult have these problems made it for you to do your work, take care of things at home, or get along with other people?: Not difficult at all  PHQ-9 Total Score: 6  PHQ-9 Total Score: 6      PHQ Scores 12/21/2022 4/6/2022 4/6/2021 9/16/2020 6/5/2020 9/17/2019 6/13/2019   PHQ2 Score 0 0 0 0 0 0 0   PHQ9 Score 6 0 0 0 0 0 0     Patient reports she was having incomplete bladder emptying, and PCP started him on Flomax, tolerating well, denies side effects. He still wakes up at night 3 times per night. He does not want to see urologist.      Review of Systems   Constitutional:  Positive for fatigue. Negative for activity change, appetite change, chills, diaphoresis, fever and unexpected weight change. HENT:  Positive for hearing loss (has hearing aids).     Eyes:  Positive for visual disturbance (stable, chronic). Respiratory:  Negative for cough, chest tightness, shortness of breath and wheezing. Cardiovascular:  Negative for chest pain, palpitations and leg swelling. Gastrointestinal:  Negative for abdominal distention, abdominal pain, constipation, diarrhea, nausea and vomiting. Endocrine: Negative for cold intolerance, heat intolerance, polydipsia, polyphagia and polyuria. Genitourinary:  Positive for frequency. Wakes up 3 times per night to urinate. Neurological:  Negative for weakness and numbness. Hematological:  Does not bruise/bleed easily. Psychiatric/Behavioral:  Negative for dysphoric mood and sleep disturbance. The patient is nervous/anxious.        -vital signs stable and within normal limits except high blood pressure, and overweight per BMI    BP (!) 154/88   Pulse 61   Temp 97.6 °F (36.4 °C)   Ht 5' 10\" (1.778 m)   Wt 192 lb 9.6 oz (87.4 kg)   SpO2 97%   BMI 27.64 kg/m²        Physical Exam  Vitals and nursing note reviewed. Constitutional:       General: He is not in acute distress. Appearance: Normal appearance. He is well-developed. He is not diaphoretic. HENT:      Head: Normocephalic and atraumatic. Right Ear: External ear normal.      Left Ear: External ear normal.      Mouth/Throat:      Comments: I did not examine the mouth due to coronavirus pandemic and wearing masks. Eyes:      General: Lids are normal. No scleral icterus. Right eye: No discharge. Left eye: No discharge. Extraocular Movements: Extraocular movements intact. Conjunctiva/sclera: Conjunctivae normal.   Neck:      Thyroid: No thyromegaly. Cardiovascular:      Rate and Rhythm: Normal rate and regular rhythm. Heart sounds: Normal heart sounds. No murmur heard. Pulmonary:      Effort: Pulmonary effort is normal. No respiratory distress. Breath sounds: Normal breath sounds. No wheezing or rales.    Chest:      Chest wall: No tenderness. Abdominal:      General: Bowel sounds are normal. There is no distension. Palpations: Abdomen is soft. There is no hepatomegaly or splenomegaly. Tenderness: There is no abdominal tenderness. Comments: Obese abdomen. Musculoskeletal:         General: No tenderness. Normal range of motion. Cervical back: Normal range of motion and neck supple. Lymphadenopathy:      Cervical: No cervical adenopathy. Skin:     General: Skin is warm and dry. Capillary Refill: Capillary refill takes less than 2 seconds. Findings: No rash. Neurological:      Mental Status: He is alert and oriented to person, place, and time. Gait: Gait normal.      Deep Tendon Reflexes: Reflexes are normal and symmetric. Psychiatric:         Attention and Perception: Attention normal.         Mood and Affect: Mood is anxious. Speech: Speech is rapid and pressured. Behavior: Behavior normal.         Thought Content: Thought content normal.         Judgment: Judgment normal.         I personally reviewed testing with patient, and all questions answered.   Hyperglycemia controlled    Otherwise labs within normal limits       Lab Results   Component Value Date    WBC 9.4 03/31/2022    HGB 14.2 03/31/2022    HCT 42.1 03/31/2022    MCV 86.9 03/31/2022     03/31/2022       Lab Results   Component Value Date/Time     03/31/2022 08:05 AM    K 4.7 03/31/2022 08:05 AM     03/31/2022 08:05 AM    CO2 23 03/31/2022 08:05 AM    BUN 23 03/31/2022 08:05 AM    CREATININE 0.96 03/31/2022 08:05 AM    GLUCOSE 122 03/31/2022 08:05 AM    GLUCOSE 110 12/22/2011 08:05 AM    CALCIUM 10.2 03/31/2022 08:05 AM        Lab Results   Component Value Date    ALT 20 03/31/2022    AST 18 03/31/2022    ALKPHOS 78 03/31/2022    BILITOT 0.46 03/31/2022       No results found for: TSHFT4, TSH    Lab Results   Component Value Date    CHOL 166 03/31/2022    CHOL 156 09/29/2020    CHOL 156 09/13/2019     Lab Results   Component Value Date    TRIG 94 03/31/2022    TRIG 115 09/29/2020    TRIG 103 09/13/2019     Lab Results   Component Value Date    HDL 45 03/31/2022    HDL 46 04/13/2021    HDL 42 09/29/2020     Lab Results   Component Value Date    LDLCHOLESTEROL 102 03/31/2022    LDLCHOLESTEROL 96 04/13/2021    LDLCHOLESTEROL 91 09/29/2020     Lab Results   Component Value Date    CHOLHDLRATIO 3.7 03/31/2022    CHOLHDLRATIO 3.5 04/13/2021    CHOLHDLRATIO 3.7 09/29/2020         No results found for: AHXURTDZ86  No results found for: FOLATE  Lab Results   Component Value Date    VITD25 37.7 04/13/2021         Orders Placed This Encounter   Medications    metFORMIN (GLUCOPHAGE) 1000 MG tablet     Sig: Take 1 tablet by mouth 2 times daily (with meals) Keep Blood Glucose      Dispense:  180 tablet     Refill:  0    lisinopril (PRINIVIL;ZESTRIL) 40 MG tablet     Sig: Take 1 tablet by mouth every morning     Dispense:  90 tablet     Refill:  3    atenolol (TENORMIN) 50 MG tablet     Sig: Take 1 tablet by mouth Daily with supper Goal BP bellow 135/85 and above 115/65, heart rate 56 to 90 bpm     Dispense:  90 tablet     Refill:  3    atorvastatin (LIPITOR) 10 MG tablet     Sig: Take 1 tablet by mouth Daily with supper     Dispense:  90 tablet     Refill:  3    sertraline (ZOLOFT) 50 MG tablet     Sig: Take 1 tablet by mouth every evening     Dispense:  90 tablet     Refill:  3    tamsulosin (FLOMAX) 0.4 MG capsule     Sig: Take 1 capsule by mouth Daily with supper For incomplete bladder emptying     Dispense:  90 capsule     Refill:  1       Orders Placed This Encounter   Procedures    CBC     Standing Status:   Future     Standing Expiration Date:   12/21/2023    Comprehensive Metabolic Panel     Standing Status:   Future     Standing Expiration Date:   12/21/2023    Hemoglobin A1C     Standing Status:   Future     Standing Expiration Date:   12/21/2023    Uric Acid     Standing Status:   Future Standing Expiration Date:   12/21/2023    TSH     Standing Status:   Future     Standing Expiration Date:   12/21/2023    Magnesium     Standing Status:   Future     Standing Expiration Date:   12/21/2023    Urinalysis with Reflex to Culture     Standing Status:   Future     Standing Expiration Date:   12/21/2023     Order Specific Question:   SPECIFY(EX-CATH,MIDSTREAM,CYSTO,ETC)? Answer:   MIDSTREAM    Vitamin B12 & Folate     Standing Status:   Future     Standing Expiration Date:   12/21/2023    Lipid Panel     Standing Status:   Future     Standing Expiration Date:   12/21/2023     Order Specific Question:   Is Patient Fasting?/# of Hours     Answer:   8-10 Hours, water ok to drink    EKG 12 Lead     Standing Status:   Future     Standing Expiration Date:   12/21/2023     Order Specific Question:   Reason for Exam?     Answer:   Hypertension       Medications Discontinued During This Encounter   Medication Reason    glimepiride (AMARYL) 4 MG tablet Stop Taking at Discharge    sertraline (ZOLOFT) 50 MG tablet REORDER    atenolol (TENORMIN) 50 MG tablet REORDER    atorvastatin (LIPITOR) 10 MG tablet REORDER    lisinopril (PRINIVIL;ZESTRIL) 40 MG tablet REORDER    metFORMIN (GLUCOPHAGE) 1000 MG tablet REORDER         On this date 12/21/2022 I have spent 39 minutes reviewing previous notes, test results and face to face with the patient discussing the diagnosis and importance of compliance with the treatment plan as well as documenting on the day of the visit. This note was completed by using the assistance of a speech-recognition program. However, inadvertent computerized transcription errors may be present. Although every effort was made to ensure accuracy, no guarantees can be provided that every mistake has been identified and corrected by editing and to establish care with new PCP. An electronic signature was used to authenticate this note.   Electronically signed by Malka Koch MD on 12/26/2022 at 12:40 PM

## 2022-12-26 PROBLEM — R33.9 INCOMPLETE BLADDER EMPTYING: Status: ACTIVE | Noted: 2022-12-26

## 2022-12-28 ENCOUNTER — HOSPITAL ENCOUNTER (OUTPATIENT)
Age: 82
Discharge: HOME | End: 2022-12-28
Payer: MEDICARE

## 2022-12-28 DIAGNOSIS — E11.9 TYPE 2 DIABETES MELLITUS WITHOUT COMPLICATION, WITHOUT LONG-TERM CURRENT USE OF INSULIN (HCC): ICD-10-CM

## 2022-12-28 DIAGNOSIS — I10 ESSENTIAL HYPERTENSION: ICD-10-CM

## 2022-12-28 DIAGNOSIS — E78.2 MIXED HYPERLIPIDEMIA: ICD-10-CM

## 2022-12-28 PROCEDURE — 93005 ELECTROCARDIOGRAM TRACING: CPT

## 2022-12-29 DIAGNOSIS — E11.9 TYPE 2 DIABETES MELLITUS WITHOUT COMPLICATION, WITHOUT LONG-TERM CURRENT USE OF INSULIN (HCC): Primary | ICD-10-CM

## 2022-12-29 LAB
EKG ATRIAL RATE: 53 BPM
EKG P AXIS: 61 DEGREES
EKG P-R INTERVAL: 188 MS
EKG Q-T INTERVAL: 440 MS
EKG QRS DURATION: 86 MS
EKG QTC CALCULATION (BAZETT): 412 MS
EKG R AXIS: 62 DEGREES
EKG T AXIS: 48 DEGREES
EKG VENTRICULAR RATE: 53 BPM

## 2022-12-30 ENCOUNTER — NURSE ONLY (OUTPATIENT)
Dept: FAMILY MEDICINE CLINIC | Age: 82
End: 2022-12-30

## 2022-12-30 VITALS — HEART RATE: 64 BPM | OXYGEN SATURATION: 98 % | DIASTOLIC BLOOD PRESSURE: 80 MMHG | SYSTOLIC BLOOD PRESSURE: 130 MMHG

## 2022-12-30 DIAGNOSIS — I10 ESSENTIAL HYPERTENSION: Primary | ICD-10-CM

## 2022-12-30 NOTE — PROGRESS NOTES
Chief Complaint   Patient presents with    Hypertension    Blood Pressure Check        Patient is here for blood pressure recheck. 1. Essential hypertension        Well controlled BP   Continue current treatment.        BP Readings from Last 3 Encounters:   12/30/22 130/80   12/21/22 (!) 154/88   04/06/22 110/64       Pulse Readings from Last 3 Encounters:   12/30/22 64   12/21/22 61   04/06/22 61       Future Appointments   Date Time Provider [facility]   4/27/2023  8:00 AM [facility]

## 2023-01-03 ENCOUNTER — TELEPHONE (OUTPATIENT)
Dept: FAMILY MEDICINE CLINIC | Age: 83
End: 2023-01-03

## 2023-01-03 DIAGNOSIS — E11.9 TYPE 2 DIABETES MELLITUS WITHOUT COMPLICATION, WITHOUT LONG-TERM CURRENT USE OF INSULIN (HCC): ICD-10-CM

## 2023-01-03 RX ORDER — GLIMEPIRIDE 2 MG/1
2 TABLET ORAL
Qty: 90 TABLET | Refills: 0 | Status: SHIPPED | OUTPATIENT
Start: 2023-01-03

## 2023-01-03 NOTE — TELEPHONE ENCOUNTER
Patient called stated that he will like to be back started on his medication he was on before glimperide , stated that the current medication called in jardiance is $700 , since he has not been taking medication his sugars are high running around 200's     Please Approve or Refuse.   Send to Pharmacy per Pt's Request: Garth Travis      Next Visit Date:  4/27/2023   Last Visit Date: 12/21/2022    Hemoglobin A1C (%)   Date Value   03/31/2022 7.5 (H)   04/06/2021 6.7   09/29/2020 7.1 (H)             ( goal A1C is < 7)   BP Readings from Last 3 Encounters:   12/30/22 130/80   12/21/22 (!) 154/88   04/06/22 110/64          (goal 120/80)  BUN   Date Value Ref Range Status   03/31/2022 23 8 - 23 mg/dL Final     Creatinine   Date Value Ref Range Status   03/31/2022 0.96 0.70 - 1.20 mg/dL Final     Potassium   Date Value Ref Range Status   03/31/2022 4.7 3.7 - 5.3 mmol/L Final

## 2023-01-03 NOTE — TELEPHONE ENCOUNTER
Please let the patient know to  prescription from the pharmacy. To continue with metformin 1000 Mg twice a day, does he need this refilled? To self monitor blood glucose daily, take with food, high risk of very low blood glucose which could be dangerous for him    If below 80 to stop taking glimepiride  Orders Placed This Encounter   Medications    glimepiride (AMARYL) 2 MG tablet     Sig: Take 1 tablet by mouth every morning (before breakfast) Self monitor blood glucose daily, if blood glucose below 80, to stop taking this medication     Dispense:  90 tablet     Refill:  0         [facility] [address] [place] [address] [phone]  [address] [place] [address]  Phone: [phone] Fax: [phone]      No orders of the defined types were placed in this encounter. Future Appointments   Date Time Provider Forrest Dailey   4/27/2023  8:00 AM Julia Perez MD [facility]       Thank you!

## 2023-04-18 ENCOUNTER — HOSPITAL ENCOUNTER (OUTPATIENT)
Age: 83
Discharge: HOME | End: 2023-04-18
Payer: MEDICARE

## 2023-04-18 DIAGNOSIS — I10 ESSENTIAL HYPERTENSION: ICD-10-CM

## 2023-04-18 DIAGNOSIS — E11.9 TYPE 2 DIABETES MELLITUS WITHOUT COMPLICATION, WITHOUT LONG-TERM CURRENT USE OF INSULIN (HCC): ICD-10-CM

## 2023-04-18 DIAGNOSIS — E78.2 MIXED HYPERLIPIDEMIA: ICD-10-CM

## 2023-04-18 LAB
ALBUMIN SERPL-MCNC: 4.2 G/DL (ref 3.5–5.2)
ALP SERPL-CCNC: 69 U/L (ref 40–129)
ALT SERPL-CCNC: 20 U/L (ref 5–41)
ANION GAP SERPL CALCULATED.3IONS-SCNC: 12 MMOL/L (ref 9–17)
AST SERPL-CCNC: 16 U/L
BILIRUB SERPL-MCNC: 0.5 MG/DL (ref 0.3–1.2)
BILIRUBIN URINE: NEGATIVE
BUN SERPL-MCNC: 20 MG/DL (ref 8–23)
CALCIUM SERPL-MCNC: 10.4 MG/DL (ref 8.6–10.4)
CHLORIDE SERPL-SCNC: 107 MMOL/L (ref 98–107)
CHOLEST SERPL-MCNC: 180 MG/DL
CHOLESTEROL/HDL RATIO: 4.1
CO2 SERPL-SCNC: 26 MMOL/L (ref 20–31)
COLOR: YELLOW
COMMENT UA: NORMAL
CREAT SERPL-MCNC: 0.95 MG/DL (ref 0.7–1.2)
EST. AVERAGE GLUCOSE BLD GHB EST-MCNC: 166 MG/DL
FOLATE SERPL-MCNC: >20 NG/ML
GFR SERPL CREATININE-BSD FRML MDRD: >60 ML/MIN/1.73M2
GLUCOSE SERPL-MCNC: 149 MG/DL (ref 70–99)
GLUCOSE UR STRIP.AUTO-MCNC: NEGATIVE MG/DL
HBA1C MFR BLD: 7.4 % (ref 4–6)
HCT VFR BLD AUTO: 40 % (ref 41–53)
HDLC SERPL-MCNC: 44 MG/DL
HGB BLD-MCNC: 13.8 G/DL (ref 13.5–17.5)
KETONES UR STRIP.AUTO-MCNC: NEGATIVE MG/DL
LDLC SERPL CALC-MCNC: 112 MG/DL (ref 0–130)
LEUKOCYTE ESTERASE UR QL STRIP.AUTO: NEGATIVE
MAGNESIUM SERPL-MCNC: 1.9 MG/DL (ref 1.6–2.6)
MCH RBC QN AUTO: 29.7 PG (ref 26–34)
MCHC RBC AUTO-ENTMCNC: 34.5 G/DL (ref 31–37)
MCV RBC AUTO: 86.2 FL (ref 80–100)
NITRITE UR QL STRIP.AUTO: NEGATIVE
PDW BLD-RTO: 14 % (ref 11.5–14.9)
PLATELET # BLD AUTO: 217 K/UL (ref 150–450)
PMV BLD AUTO: 8.4 FL (ref 6–12)
POTASSIUM SERPL-SCNC: 4.8 MMOL/L (ref 3.7–5.3)
PROT SERPL-MCNC: 7.5 G/DL (ref 6.4–8.3)
PROT UR STRIP.AUTO-MCNC: 7 MG/DL (ref 5–8)
PROT UR STRIP.AUTO-MCNC: NEGATIVE MG/DL
RBC # BLD: 4.65 M/UL (ref 4.5–5.9)
SODIUM SERPL-SCNC: 145 MMOL/L (ref 135–144)
SPECIFIC GRAVITY UA: 1.01 (ref 1–1.03)
TRIGL SERPL-MCNC: 119 MG/DL
TSH SERPL-ACNC: 2.8 UIU/ML (ref 0.3–5)
TURBIDITY: CLEAR
URATE SERPL-MCNC: 7.8 MG/DL (ref 3.4–7)
URINE HGB: NEGATIVE
UROBILINOGEN, URINE: NORMAL
VIT B12 SERPL-MCNC: 419 PG/ML (ref 232–1245)
WBC # BLD AUTO: 8.2 K/UL (ref 3.5–11)

## 2023-04-18 PROCEDURE — 83735 ASSAY OF MAGNESIUM: CPT

## 2023-04-18 PROCEDURE — 85027 COMPLETE CBC AUTOMATED: CPT

## 2023-04-18 PROCEDURE — 84443 ASSAY THYROID STIM HORMONE: CPT

## 2023-04-18 PROCEDURE — 83036 HEMOGLOBIN GLYCOSYLATED A1C: CPT

## 2023-04-18 PROCEDURE — 81003 URINALYSIS AUTO W/O SCOPE: CPT

## 2023-04-18 PROCEDURE — 82746 ASSAY OF FOLIC ACID SERUM: CPT

## 2023-04-18 PROCEDURE — 84550 ASSAY OF BLOOD/URIC ACID: CPT

## 2023-04-18 PROCEDURE — 80061 LIPID PANEL: CPT

## 2023-04-18 PROCEDURE — 36415 COLL VENOUS BLD VENIPUNCTURE: CPT

## 2023-04-18 PROCEDURE — 82607 VITAMIN B-12: CPT

## 2023-04-18 PROCEDURE — 80053 COMPREHEN METABOLIC PANEL: CPT

## 2023-04-18 NOTE — RESULT ENCOUNTER NOTE
Please notify patient: Stable type 2 diabetes mellitus hemoglobin A1c 7.4  Glucose 149  Cut down sweets and starches, and increase proteins in diet like chicken, fish, cottage cheese, yogurt  Sodium mildly high, needs to drink 6-8 x 8 ounce glasses of water    Uric acid high, can cause gout and kidney stones--he will benefit from starting allopurinol, let me know if he agrees and I can send to the pharmacy    Anemia is new, did not have it 1 year ago    Lipids are worsening. Is he taking Lipitor 10 Mg daily? Every day?   If yes then we can increase the dosage of Lipitor  Otherwise labs within normal limits  continue current treatment    Future Appointments  4/27/2023  8:00 AM    Helio Kauffman MD     fp [facility]

## 2023-04-24 SDOH — ECONOMIC STABILITY: TRANSPORTATION INSECURITY
IN THE PAST 12 MONTHS, HAS LACK OF TRANSPORTATION KEPT YOU FROM MEETINGS, WORK, OR FROM GETTING THINGS NEEDED FOR DAILY LIVING?: NO

## 2023-04-24 SDOH — ECONOMIC STABILITY: FOOD INSECURITY: WITHIN THE PAST 12 MONTHS, YOU WORRIED THAT YOUR FOOD WOULD RUN OUT BEFORE YOU GOT MONEY TO BUY MORE.: NEVER TRUE

## 2023-04-24 SDOH — HEALTH STABILITY: PHYSICAL HEALTH: ON AVERAGE, HOW MANY MINUTES DO YOU ENGAGE IN EXERCISE AT THIS LEVEL?: 60 MIN

## 2023-04-24 SDOH — ECONOMIC STABILITY: INCOME INSECURITY: HOW HARD IS IT FOR YOU TO PAY FOR THE VERY BASICS LIKE FOOD, HOUSING, MEDICAL CARE, AND HEATING?: NOT HARD AT ALL

## 2023-04-24 SDOH — HEALTH STABILITY: PHYSICAL HEALTH: ON AVERAGE, HOW MANY DAYS PER WEEK DO YOU ENGAGE IN MODERATE TO STRENUOUS EXERCISE (LIKE A BRISK WALK)?: 3 DAYS

## 2023-04-24 SDOH — ECONOMIC STABILITY: FOOD INSECURITY: WITHIN THE PAST 12 MONTHS, THE FOOD YOU BOUGHT JUST DIDN'T LAST AND YOU DIDN'T HAVE MONEY TO GET MORE.: NEVER TRUE

## 2023-04-24 SDOH — ECONOMIC STABILITY: HOUSING INSECURITY
IN THE LAST 12 MONTHS, WAS THERE A TIME WHEN YOU DID NOT HAVE A STEADY PLACE TO SLEEP OR SLEPT IN A SHELTER (INCLUDING NOW)?: NO

## 2023-04-24 ASSESSMENT — PATIENT HEALTH QUESTIONNAIRE - PHQ9
10. IF YOU CHECKED OFF ANY PROBLEMS, HOW DIFFICULT HAVE THESE PROBLEMS MADE IT FOR YOU TO DO YOUR WORK, TAKE CARE OF THINGS AT HOME, OR GET ALONG WITH OTHER PEOPLE: 0
SUM OF ALL RESPONSES TO PHQ QUESTIONS 1-9: 6
5. POOR APPETITE OR OVEREATING: 0
3. TROUBLE FALLING OR STAYING ASLEEP: 3
9. THOUGHTS THAT YOU WOULD BE BETTER OFF DEAD, OR OF HURTING YOURSELF: 0
6. FEELING BAD ABOUT YOURSELF - OR THAT YOU ARE A FAILURE OR HAVE LET YOURSELF OR YOUR FAMILY DOWN: 0
4. FEELING TIRED OR HAVING LITTLE ENERGY: 3
8. MOVING OR SPEAKING SO SLOWLY THAT OTHER PEOPLE COULD HAVE NOTICED. OR THE OPPOSITE, BEING SO FIGETY OR RESTLESS THAT YOU HAVE BEEN MOVING AROUND A LOT MORE THAN USUAL: 0
SUM OF ALL RESPONSES TO PHQ QUESTIONS 1-9: 6
SUM OF ALL RESPONSES TO PHQ QUESTIONS 1-9: 6
1. LITTLE INTEREST OR PLEASURE IN DOING THINGS: 0
2. FEELING DOWN, DEPRESSED OR HOPELESS: 0
SUM OF ALL RESPONSES TO PHQ QUESTIONS 1-9: 6
7. TROUBLE CONCENTRATING ON THINGS, SUCH AS READING THE NEWSPAPER OR WATCHING TELEVISION: 0
SUM OF ALL RESPONSES TO PHQ9 QUESTIONS 1 & 2: 0

## 2023-04-24 ASSESSMENT — LIFESTYLE VARIABLES
HOW MANY STANDARD DRINKS CONTAINING ALCOHOL DO YOU HAVE ON A TYPICAL DAY: 0
HOW MANY STANDARD DRINKS CONTAINING ALCOHOL DO YOU HAVE ON A TYPICAL DAY: PATIENT DOES NOT DRINK
HOW OFTEN DO YOU HAVE SIX OR MORE DRINKS ON ONE OCCASION: 1
HOW OFTEN DO YOU HAVE A DRINK CONTAINING ALCOHOL: 1
HOW OFTEN DO YOU HAVE A DRINK CONTAINING ALCOHOL: NEVER

## 2023-04-27 ENCOUNTER — OFFICE VISIT (OUTPATIENT)
Dept: FAMILY MEDICINE CLINIC | Age: 83
End: 2023-04-27

## 2023-04-27 VITALS
BODY MASS INDEX: 27.94 KG/M2 | HEIGHT: 70 IN | TEMPERATURE: 97.7 F | WEIGHT: 195.2 LBS | OXYGEN SATURATION: 97 % | SYSTOLIC BLOOD PRESSURE: 124 MMHG | HEART RATE: 60 BPM | DIASTOLIC BLOOD PRESSURE: 82 MMHG

## 2023-04-27 DIAGNOSIS — D64.9 ANEMIA, UNSPECIFIED TYPE: ICD-10-CM

## 2023-04-27 DIAGNOSIS — I10 ESSENTIAL HYPERTENSION: ICD-10-CM

## 2023-04-27 DIAGNOSIS — Z00.00 MEDICARE ANNUAL WELLNESS VISIT, SUBSEQUENT: Primary | ICD-10-CM

## 2023-04-27 DIAGNOSIS — J01.80 ACUTE NON-RECURRENT SINUSITIS OF OTHER SINUS: ICD-10-CM

## 2023-04-27 DIAGNOSIS — E78.2 MIXED HYPERLIPIDEMIA: ICD-10-CM

## 2023-04-27 DIAGNOSIS — E11.9 TYPE 2 DIABETES MELLITUS WITHOUT COMPLICATION, WITHOUT LONG-TERM CURRENT USE OF INSULIN (HCC): ICD-10-CM

## 2023-04-27 DIAGNOSIS — E79.0 ELEVATED URIC ACID IN BLOOD: ICD-10-CM

## 2023-04-27 DIAGNOSIS — F33.42 RECURRENT MAJOR DEPRESSIVE DISORDER, IN FULL REMISSION (HCC): ICD-10-CM

## 2023-04-27 RX ORDER — AZITHROMYCIN 250 MG/1
TABLET, FILM COATED ORAL
Qty: 6 TABLET | Refills: 0 | Status: SHIPPED | OUTPATIENT
Start: 2023-04-27 | End: 2023-05-02

## 2023-04-27 ASSESSMENT — ENCOUNTER SYMPTOMS
CHEST TIGHTNESS: 0
ABDOMINAL DISTENTION: 0
CONSTIPATION: 0
SHORTNESS OF BREATH: 0
VOMITING: 0
SINUS PRESSURE: 1
COUGH: 1
NAUSEA: 0
RHINORRHEA: 1
ABDOMINAL PAIN: 0
WHEEZING: 0
SINUS PAIN: 0
DIARRHEA: 0

## 2023-04-27 ASSESSMENT — VISUAL ACUITY
OD_CC: 20/20
OS_CC: 20/40

## 2023-04-27 NOTE — PROGRESS NOTES
Visit Information    Have you changed or started any medications since your last visit including any over-the-counter medicines, vitamins, or herbal medicines? no   Have you stopped taking any of your medications? Is so, why? -  no  Are you having any side effects from any of your medications? - yes -     Have you seen any other physician or provider since your last visit? yes -    Have you had any other diagnostic tests since your last visit? yes -    Have you been seen in the emergency room and/or had an admission in a hospital since we last saw you?  no   Have you had your routine dental cleaning in the past 6 months?  yes -      Do you have an active MyChart account? If no, what is the barrier?   Yes    Patient Care Team:  Dot Cotton MD as PCP - General (Family Medicine)  Dot Cotton MD as PCP - Empaneled Provider    Medical History Review  Past Medical, Family, and Social History reviewed and does contribute to the patient presenting condition    Health Maintenance   Topic Date Due    Annual Wellness Visit (AWV)  04/07/2022    Lipids  04/18/2024    Depression Monitoring  04/24/2024    DTaP/Tdap/Td vaccine (2 - Td or Tdap) 04/18/2026    Flu vaccine  Completed    Shingles vaccine  Completed    Pneumococcal 65+ years Vaccine  Completed    COVID-19 Vaccine  Completed    Hepatitis A vaccine  Aged Out    Hib vaccine  Aged Out    Meningococcal (ACWY) vaccine  Aged Out    Hepatitis C screen  Discontinued
Mouth/Throat:      Comments: I did not examine the mouth due to coronavirus pandemic and wearing masks. Eyes:      General: Lids are normal. No scleral icterus. Right eye: No discharge. Left eye: No discharge. Extraocular Movements: Extraocular movements intact. Conjunctiva/sclera: Conjunctivae normal.   Neck:      Thyroid: No thyromegaly. Cardiovascular:      Rate and Rhythm: Normal rate and regular rhythm. Heart sounds: Normal heart sounds. No murmur heard. Pulmonary:      Effort: Pulmonary effort is normal. No respiratory distress. Breath sounds: Normal breath sounds. No wheezing or rales. Chest:      Chest wall: No tenderness. Abdominal:      General: Bowel sounds are normal. There is no distension. Palpations: Abdomen is soft. There is no hepatomegaly or splenomegaly. Tenderness: There is no abdominal tenderness. Comments: Obese abdomen. Musculoskeletal:         General: No tenderness. Normal range of motion. Cervical back: Normal range of motion and neck supple. Right lower leg: No edema. Left lower leg: No edema. Lymphadenopathy:      Cervical: No cervical adenopathy. Skin:     General: Skin is warm and dry. Capillary Refill: Capillary refill takes less than 2 seconds. Findings: No rash. Neurological:      Mental Status: He is alert and oriented to person, place, and time. Gait: Gait normal.      Deep Tendon Reflexes: Reflexes are normal and symmetric. Psychiatric:         Attention and Perception: Attention normal.         Mood and Affect: Mood is anxious. Speech: Speech is rapid and pressured. Behavior: Behavior normal.         Thought Content: Thought content normal.         Judgment: Judgment normal.           I personally reviewed testing with patient.   New anemia  Hyperglycemia controlled  Borderline high calcium  Mildly high lipids  Otherwise labs within normal limits    Lab Results

## 2023-04-27 NOTE — PATIENT INSTRUCTIONS
Care instructions adapted under license by [facility] [place] ([facility]). If you have questions about a medical condition or this instruction, always ask your healthcare professional. [place] [address] any warranty or liability for your use of this information. Personalized Preventive Plan for Federica Dumont - 4/27/2023  Medicare offers a range of preventive health benefits. Some of the tests and screenings are paid in full while other may be subject to a deductible, co-insurance, and/or copay. Some of these benefits include a comprehensive review of your medical history including lifestyle, illnesses that may run in your family, and various assessments and screenings as appropriate. After reviewing your medical record and screening and assessments performed today your provider may have ordered immunizations, labs, imaging, and/or referrals for you. A list of these orders (if applicable) as well as your Preventive Care list are included within your After Visit Summary for your review. Other Preventive Recommendations:    A preventive eye exam performed by an eye specialist is recommended every 1-2 years to screen for glaucoma; cataracts, macular degeneration, and other eye disorders. A preventive dental visit is recommended every 6 months. Try to get at least 150 minutes of exercise per week or 10,000 steps per day on a pedometer . Order or download the FREE \"Exercise & Physical Activity: Your Everyday Guide\" from [employer]. Call 4-643.265.4447 or search [employer] online. You need 3130-1421 mg of calcium and 2200-6232 IU of vitamin D per day. It is possible to meet your calcium requirement with diet alone, but a vitamin D supplement is usually necessary to meet this goal.  When exposed to the sun, use a sunscreen that protects against both UVA and UVB radiation with an SPF of 30 or greater.  Reapply every 2 to 3 hours or after sweating, drying off with a

## 2023-05-02 ENCOUNTER — HOSPITAL ENCOUNTER (OUTPATIENT)
Age: 83
Discharge: HOME | End: 2023-05-02
Payer: MEDICARE

## 2023-05-02 DIAGNOSIS — E11.9 TYPE 2 DIABETES MELLITUS WITHOUT COMPLICATION, WITHOUT LONG-TERM CURRENT USE OF INSULIN (HCC): ICD-10-CM

## 2023-05-02 DIAGNOSIS — D64.9 ANEMIA, UNSPECIFIED TYPE: ICD-10-CM

## 2023-05-02 LAB
ABSOLUTE EOS #: 0.4 K/UL (ref 0–0.4)
ABSOLUTE LYMPH #: 2.9 K/UL (ref 1–4.8)
ABSOLUTE MONO #: 0.5 K/UL (ref 0.1–1.3)
BASOPHILS # BLD: 1 % (ref 0–2)
BASOPHILS ABSOLUTE: 0.1 K/UL (ref 0–0.2)
EOSINOPHILS RELATIVE PERCENT: 4 % (ref 0–4)
FERRITIN SERPL-MCNC: 87 NG/ML (ref 30–400)
HCT VFR BLD AUTO: 40.9 % (ref 41–53)
HGB BLD-MCNC: 14.1 G/DL (ref 13.5–17.5)
IRON SATURATION: 25 % (ref 20–55)
IRON SERPL-MCNC: 83 UG/DL (ref 59–158)
LDLC SERPL-MCNC: 195 U/L (ref 135–225)
LYMPHOCYTES # BLD: 31 % (ref 24–44)
MCH RBC QN AUTO: 29.5 PG (ref 26–34)
MCHC RBC AUTO-ENTMCNC: 34.4 G/DL (ref 31–37)
MCV RBC AUTO: 85.6 FL (ref 80–100)
MONOCYTES # BLD: 5 % (ref 1–7)
PDW BLD-RTO: 13.7 % (ref 11.5–14.9)
PLATELET # BLD AUTO: 270 K/UL (ref 150–450)
PMV BLD AUTO: 7.9 FL (ref 6–12)
RBC # BLD: 4.77 M/UL (ref 4.5–5.9)
SEG NEUTROPHILS: 59 % (ref 36–66)
SEGMENTED NEUTROPHILS ABSOLUTE COUNT: 5.5 K/UL (ref 1.3–9.1)
TIBC SERPL-MCNC: 333 UG/DL (ref 250–450)
UNSATURATED IRON BINDING CAPACITY: 250 UG/DL (ref 112–347)
WBC # BLD AUTO: 9.4 K/UL (ref 3.5–11)

## 2023-05-02 PROCEDURE — 83550 IRON BINDING TEST: CPT

## 2023-05-02 PROCEDURE — 84166 PROTEIN E-PHORESIS/URINE/CSF: CPT

## 2023-05-02 PROCEDURE — 85025 COMPLETE CBC W/AUTO DIFF WBC: CPT

## 2023-05-02 PROCEDURE — 83540 ASSAY OF IRON: CPT

## 2023-05-02 PROCEDURE — 84165 PROTEIN E-PHORESIS SERUM: CPT

## 2023-05-02 PROCEDURE — 36415 COLL VENOUS BLD VENIPUNCTURE: CPT

## 2023-05-02 PROCEDURE — 84155 ASSAY OF PROTEIN SERUM: CPT

## 2023-05-02 PROCEDURE — 83615 LACTATE (LD) (LDH) ENZYME: CPT

## 2023-05-02 PROCEDURE — 82728 ASSAY OF FERRITIN: CPT

## 2023-05-02 PROCEDURE — 84156 ASSAY OF PROTEIN URINE: CPT

## 2023-05-03 LAB
ALBUMIN (CALCULATED): 5 G/DL (ref 3.2–5.2)
ALBUMIN PERCENT: 66 % (ref 45–65)
ALPHA 1 PERCENT: 2 % (ref 3–6)
ALPHA 2 PERCENT: 10 % (ref 6–13)
ALPHA1 GLOB SERPL ELPH-MCNC: 0.2 G/DL (ref 0.1–0.4)
ALPHA2 GLOB SERPL ELPH-MCNC: 0.7 G/DL (ref 0.5–0.9)
B-GLOBULIN SERPL ELPH-MCNC: 0.8 G/DL (ref 0.5–1.1)
BETA PERCENT: 10 % (ref 11–19)
GAMMA GLOB SERPL ELPH-MCNC: 0.9 G/DL (ref 0.5–1.5)
GAMMA GLOBULIN %: 12 % (ref 9–20)
PATH REV BLD -IMP: NORMAL
PATHOLOGIST: ABNORMAL
PROT SERPL-MCNC: 7.6 G/DL (ref 6.4–8.3)
PROTEIN ELECTROPHORESIS, SERUM: ABNORMAL
SURGICAL PATHOLOGY REPORT: NORMAL
TOTAL PROT. SUM,%: 100 % (ref 98–102)
TOTAL PROT. SUM: 7.6 G/DL (ref 6.3–8.2)

## 2023-05-04 NOTE — RESULT ENCOUNTER NOTE
Please notify patient: Anemia is persistent for the past 2 weeks which is new from before  Cells in the blood are normal which is good  The anemia work-up I have done did not find a cause of his anemia    I do suggest a referral to hematologist oncologist to look at his cells and maybe bone marrow, let me know if he agrees and I can place a referral    Future Appointments  8/29/2023  10:30 AM   Adelita Navarro MD     [facility]  4/30/2024  9:45 AM    Adelita Navarro MD     [facility]

## 2023-05-05 LAB
P E INTERPRETATION, U: NORMAL
PATHOLOGIST: NORMAL
URINE TOTAL PROTEIN: 6 MG/DL

## 2023-05-11 ENCOUNTER — TELEPHONE (OUTPATIENT)
Dept: FAMILY MEDICINE CLINIC | Age: 83
End: 2023-05-11

## 2023-05-11 NOTE — TELEPHONE ENCOUNTER
Noted. Thank you!     Future Appointments   Date Time Provider Forrest Dailey   8/29/2023 10:30 [facility]   4/30/2024  9:45 AM [facility]

## 2023-05-11 NOTE — TELEPHONE ENCOUNTER
Pt called in stating that he would like to hold off on the referral until his next appointment to discuss further.

## 2023-06-04 DIAGNOSIS — E11.9 TYPE 2 DIABETES MELLITUS WITHOUT COMPLICATION, WITHOUT LONG-TERM CURRENT USE OF INSULIN (HCC): ICD-10-CM

## 2023-06-05 RX ORDER — GLIMEPIRIDE 2 MG/1
TABLET ORAL
Qty: 90 TABLET | Refills: 3 | Status: SHIPPED | OUTPATIENT
Start: 2023-06-05

## 2023-06-05 NOTE — TELEPHONE ENCOUNTER
Please Approve or Refuse.   [facility] to Pharmacy per Pt's [facility]     Next Visit Date:  8/29/2023   Last Visit Date: 4/27/2023    Hemoglobin A1C (%)   Date Value   04/18/2023 7.4 (H)   03/31/2022 7.5 (H)   04/06/2021 6.7             ( goal A1C is < 7)   BP Readings from Last 3 Encounters:   04/27/23 124/82   12/30/22 130/80   12/21/22 (!) 154/88          (goal 120/80)  BUN   Date Value Ref Range Status   04/18/2023 20 8 - 23 mg/dL Final     Creatinine   Date Value Ref Range Status   04/18/2023 0.95 0.70 - 1.20 mg/dL Final     Potassium   Date Value Ref Range Status   04/18/2023 4.8 3.7 - 5.3 mmol/L Final

## 2023-08-26 DIAGNOSIS — R33.9 INCOMPLETE BLADDER EMPTYING: ICD-10-CM

## 2023-08-27 RX ORDER — TAMSULOSIN HYDROCHLORIDE 0.4 MG/1
CAPSULE ORAL
Qty: 90 CAPSULE | Refills: 0 | Status: SHIPPED | OUTPATIENT
Start: 2023-08-27

## 2023-08-29 ENCOUNTER — OFFICE VISIT (OUTPATIENT)
Dept: FAMILY MEDICINE CLINIC | Age: 83
End: 2023-08-29
Payer: MEDICARE

## 2023-08-29 VITALS
WEIGHT: 192 LBS | DIASTOLIC BLOOD PRESSURE: 70 MMHG | HEIGHT: 71 IN | TEMPERATURE: 98.2 F | SYSTOLIC BLOOD PRESSURE: 138 MMHG | BODY MASS INDEX: 26.88 KG/M2 | OXYGEN SATURATION: 98 % | HEART RATE: 60 BPM

## 2023-08-29 DIAGNOSIS — D64.9 ANEMIA, UNSPECIFIED TYPE: ICD-10-CM

## 2023-08-29 DIAGNOSIS — E78.2 MIXED HYPERLIPIDEMIA: ICD-10-CM

## 2023-08-29 DIAGNOSIS — R33.9 INCOMPLETE BLADDER EMPTYING: ICD-10-CM

## 2023-08-29 DIAGNOSIS — E11.9 TYPE 2 DIABETES MELLITUS WITHOUT COMPLICATION, WITHOUT LONG-TERM CURRENT USE OF INSULIN (HCC): Primary | ICD-10-CM

## 2023-08-29 DIAGNOSIS — I10 ESSENTIAL HYPERTENSION: ICD-10-CM

## 2023-08-29 LAB — HBA1C MFR BLD: 7.1 %

## 2023-08-29 PROCEDURE — 3051F HG A1C>EQUAL 7.0%<8.0%: CPT | Performed by: FAMILY MEDICINE

## 2023-08-29 PROCEDURE — 3078F DIAST BP <80 MM HG: CPT | Performed by: FAMILY MEDICINE

## 2023-08-29 PROCEDURE — 3074F SYST BP LT 130 MM HG: CPT | Performed by: FAMILY MEDICINE

## 2023-08-29 PROCEDURE — 99214 OFFICE O/P EST MOD 30 MIN: CPT | Performed by: FAMILY MEDICINE

## 2023-08-29 PROCEDURE — 1036F TOBACCO NON-USER: CPT | Performed by: FAMILY MEDICINE

## 2023-08-29 PROCEDURE — 1123F ACP DISCUSS/DSCN MKR DOCD: CPT | Performed by: FAMILY MEDICINE

## 2023-08-29 PROCEDURE — 83036 HEMOGLOBIN GLYCOSYLATED A1C: CPT | Performed by: FAMILY MEDICINE

## 2023-08-29 PROCEDURE — G8427 DOCREV CUR MEDS BY ELIG CLIN: HCPCS | Performed by: FAMILY MEDICINE

## 2023-08-29 PROCEDURE — G8417 CALC BMI ABV UP PARAM F/U: HCPCS | Performed by: FAMILY MEDICINE

## 2023-08-29 RX ORDER — CALCIUM CITRATE/VITAMIN D3 200MG-6.25
TABLET ORAL
Qty: 100 EACH | Refills: 5 | Status: SHIPPED | OUTPATIENT
Start: 2023-08-29

## 2023-08-29 RX ORDER — TAMSULOSIN HYDROCHLORIDE 0.4 MG/1
CAPSULE ORAL
Qty: 90 CAPSULE | Refills: 0 | Status: SHIPPED | OUTPATIENT
Start: 2023-08-29

## 2023-08-29 RX ORDER — GLUCOSAM/CHON-MSM1/C/MANG/BOSW 500-416.6
TABLET ORAL
Qty: 100 EACH | Refills: 5 | Status: SHIPPED | OUTPATIENT
Start: 2023-08-29

## 2023-08-29 ASSESSMENT — ENCOUNTER SYMPTOMS
CONSTIPATION: 0
ABDOMINAL DISTENTION: 0
ABDOMINAL PAIN: 0
WHEEZING: 0
VOMITING: 0
SHORTNESS OF BREATH: 0
DIARRHEA: 0
NAUSEA: 0
COUGH: 0
CHEST TIGHTNESS: 0

## 2023-08-29 NOTE — PROGRESS NOTES
Visit Information    Have you changed or started any medications since your last visit including any over-the-counter medicines, vitamins, or herbal medicines? no   Have you stopped taking any of your medications? Is so, why? -  no  Are you having any side effects from any of your medications? - no    Have you seen any other physician or provider since your last visit?  no   Have you had any other diagnostic tests since your last visit?  no   Have you been seen in the emergency room and/or had an admission in a hospital since we last saw you?  no   Have you had your routine dental cleaning in the past 6 months?  yes -      Do you have an active MyChart account? If no, what is the barrier?   Yes    Patient Care Team:  Moises Piper MD as PCP - General (Family Medicine)  Moises Piper MD as PCP - Empaneled Provider  Jamal Alcantar MD (Dermatology)    Medical History Review  Past Medical, Family, and Social History reviewed and does contribute to the patient presenting condition    Health Maintenance   Topic Date Due    Flu vaccine (1) 08/01/2023    Lipids  04/18/2024    Depression Monitoring  04/24/2024    Annual Wellness Visit (AWV)  04/27/2024    DTaP/Tdap/Td vaccine (2 - Td or Tdap) 04/18/2026    Shingles vaccine  Completed    Pneumococcal 65+ years Vaccine  Completed    COVID-19 Vaccine  Completed    Hepatitis A vaccine  Aged Out    Hib vaccine  Aged Out    Meningococcal (ACWY) vaccine  Aged Out    Hepatitis C screen  Discontinued
TEST) strip     Sig: USE ONE TEST STRIP TO TEST BLOOD SUGAR daily     Dispense:  100 each     Refill:  5    tamsulosin (FLOMAX) 0.4 MG capsule     Sig: TAKE 1 CAPSULE BY MOUTH EVERY DAY with supper     Dispense:  90 capsule     Refill:  0    SITagliptin (JANUVIA) 100 MG tablet     Sig: Take 1 tablet by mouth daily ** will replace Glimepiride**     Dispense:  90 tablet     Refill:  1       Orders Placed This Encounter   Procedures    CBC with Auto Differential     Standing Status:   Future     Standing Expiration Date:   8/29/2024    Comprehensive Metabolic Panel     Standing Status:   Future     Standing Expiration Date:   8/29/2024    Vitamin B12 & Folate     Standing Status:   Future     Standing Expiration Date:   8/29/2024    Urinalysis with Reflex to Culture     Standing Status:   Future     Standing Expiration Date:   8/29/2024     Order Specific Question:   SPECIFY(EX-CATH,MIDSTREAM,CYSTO,ETC)? Answer:   MIDSTREAM    TSH     Standing Status:   Future     Standing Expiration Date:   8/29/2024    Uric Acid     Standing Status:   Future     Standing Expiration Date:   8/29/2024    Magnesium     Standing Status:   Future     Standing Expiration Date:   8/29/2024    Lipid Panel     Standing Status:   Future     Standing Expiration Date:   8/29/2024     Order Specific Question:   Is Patient Fasting?/# of Hours     Answer:   8-10 Hours, water ok to drink    Iron and TIBC     Standing Status:   Future     Standing Expiration Date:   8/28/2024     Order Specific Question:   Is Patient Fasting? Answer:   yes     Order Specific Question:   No of Hours?      Answer:   8    POCT glycosylated hemoglobin (Hb A1C)       Medications Discontinued During This Encounter   Medication Reason    blood glucose test strips (TRUE METRIX BLOOD GLUCOSE TEST) strip REORDER    TRUEplus Lancets 33G MISC REORDER    tamsulosin (FLOMAX) 0.4 MG capsule REORDER         On this date 8/29/2023 I have spent 39 minutes reviewing previous

## 2023-08-29 NOTE — RESULT ENCOUNTER NOTE
Addressed during office visit today, A1c 7.1, improved diabetes,, continue treatment recommended during the office visit.

## 2023-08-30 DIAGNOSIS — E11.9 TYPE 2 DIABETES MELLITUS WITHOUT COMPLICATION, WITHOUT LONG-TERM CURRENT USE OF INSULIN (HCC): ICD-10-CM

## 2023-08-30 RX ORDER — GLIMEPIRIDE 2 MG/1
TABLET ORAL
Qty: 90 TABLET | Refills: 0 | Status: SHIPPED | OUTPATIENT
Start: 2023-08-30

## 2023-08-30 NOTE — TELEPHONE ENCOUNTER
Please Approve or Refuse.   Send to Pharmacy per Pt's Request:      Next Visit Date:  1/12/2024   Last Visit Date: 8/29/2023    Hemoglobin A1C (%)   Date Value   08/29/2023 7.1   04/18/2023 7.4 (H)   03/31/2022 7.5 (H)             ( goal A1C is < 7)   BP Readings from Last 3 Encounters:   08/29/23 138/70   04/27/23 124/82   12/30/22 130/80          (goal 120/80)  BUN   Date Value Ref Range Status   04/18/2023 20 8 - 23 mg/dL Final     Creatinine   Date Value Ref Range Status   04/18/2023 0.95 0.70 - 1.20 mg/dL Final     Potassium   Date Value Ref Range Status   04/18/2023 4.8 3.7 - 5.3 mmol/L Final

## 2023-10-31 ENCOUNTER — HOSPITAL ENCOUNTER (OUTPATIENT)
Age: 83
Discharge: HOME | End: 2023-10-31
Payer: MEDICARE

## 2023-10-31 DIAGNOSIS — E83.52 HYPERCALCEMIA: Primary | ICD-10-CM

## 2023-10-31 DIAGNOSIS — E11.9 TYPE 2 DIABETES MELLITUS WITHOUT COMPLICATION, WITHOUT LONG-TERM CURRENT USE OF INSULIN (HCC): ICD-10-CM

## 2023-10-31 DIAGNOSIS — D64.9 ANEMIA, UNSPECIFIED TYPE: ICD-10-CM

## 2023-10-31 DIAGNOSIS — E78.2 MIXED HYPERLIPIDEMIA: ICD-10-CM

## 2023-10-31 DIAGNOSIS — I10 ESSENTIAL HYPERTENSION: ICD-10-CM

## 2023-10-31 LAB
ALBUMIN SERPL-MCNC: 4.4 G/DL (ref 3.5–5.2)
ALP SERPL-CCNC: 75 U/L (ref 40–129)
ALT SERPL-CCNC: 22 U/L (ref 5–41)
ANION GAP SERPL CALCULATED.3IONS-SCNC: 12 MMOL/L (ref 9–17)
AST SERPL-CCNC: 18 U/L
BASOPHILS # BLD: 0.1 K/UL (ref 0–0.2)
BASOPHILS NFR BLD: 1 % (ref 0–2)
BILIRUB SERPL-MCNC: 0.7 MG/DL (ref 0.3–1.2)
BILIRUB UR QL STRIP: NEGATIVE
BUN SERPL-MCNC: 18 MG/DL (ref 8–23)
CALCIUM SERPL-MCNC: 10.5 MG/DL (ref 8.6–10.4)
CHLORIDE SERPL-SCNC: 103 MMOL/L (ref 98–107)
CHOLEST SERPL-MCNC: 174 MG/DL
CHOLESTEROL/HDL RATIO: 3.7
CLARITY UR: CLEAR
CO2 SERPL-SCNC: 26 MMOL/L (ref 20–31)
COLOR UR: YELLOW
COMMENT: NORMAL
CREAT SERPL-MCNC: 0.9 MG/DL (ref 0.7–1.2)
EOSINOPHIL # BLD: 0.4 K/UL (ref 0–0.4)
EOSINOPHILS RELATIVE PERCENT: 5 % (ref 0–4)
ERYTHROCYTE [DISTWIDTH] IN BLOOD BY AUTOMATED COUNT: 14.1 % (ref 11.5–14.9)
FOLATE SERPL-MCNC: >20 NG/ML
GFR SERPL CREATININE-BSD FRML MDRD: >60 ML/MIN/1.73M2
GLUCOSE SERPL-MCNC: 149 MG/DL (ref 70–99)
GLUCOSE UR STRIP-MCNC: NEGATIVE MG/DL
HCT VFR BLD AUTO: 43.3 % (ref 41–53)
HDLC SERPL-MCNC: 47 MG/DL
HGB BLD-MCNC: 14.6 G/DL (ref 13.5–17.5)
HGB UR QL STRIP.AUTO: NEGATIVE
IRON SATN MFR SERPL: 33 % (ref 20–55)
IRON SERPL-MCNC: 110 UG/DL (ref 59–158)
KETONES UR STRIP-MCNC: NEGATIVE MG/DL
LDLC SERPL CALC-MCNC: 107 MG/DL (ref 0–130)
LEUKOCYTE ESTERASE UR QL STRIP: NEGATIVE
LYMPHOCYTES NFR BLD: 2.3 K/UL (ref 1–4.8)
LYMPHOCYTES RELATIVE PERCENT: 29 % (ref 24–44)
MAGNESIUM SERPL-MCNC: 1.9 MG/DL (ref 1.6–2.6)
MCH RBC QN AUTO: 29.9 PG (ref 26–34)
MCHC RBC AUTO-ENTMCNC: 33.7 G/DL (ref 31–37)
MCV RBC AUTO: 88.6 FL (ref 80–100)
MONOCYTES NFR BLD: 0.5 K/UL (ref 0.1–1.3)
MONOCYTES NFR BLD: 6 % (ref 1–7)
NEUTROPHILS NFR BLD: 59 % (ref 36–66)
NEUTS SEG NFR BLD: 4.9 K/UL (ref 1.3–9.1)
NITRITE UR QL STRIP: NEGATIVE
PH UR STRIP: 6.5 [PH] (ref 5–8)
PLATELET # BLD AUTO: 234 K/UL (ref 150–450)
PMV BLD AUTO: 8.2 FL (ref 6–12)
POTASSIUM SERPL-SCNC: 4.7 MMOL/L (ref 3.7–5.3)
PROT SERPL-MCNC: 7.7 G/DL (ref 6.4–8.3)
PROT UR STRIP-MCNC: NEGATIVE MG/DL
RBC # BLD AUTO: 4.89 M/UL (ref 4.5–5.9)
SODIUM SERPL-SCNC: 141 MMOL/L (ref 135–144)
SP GR UR STRIP: 1.01 (ref 1–1.03)
TIBC SERPL-MCNC: 331 UG/DL (ref 250–450)
TRIGL SERPL-MCNC: 100 MG/DL
TSH SERPL DL<=0.05 MIU/L-ACNC: 2.62 UIU/ML (ref 0.3–5)
UNSATURATED IRON BINDING CAPACITY: 221 UG/DL (ref 112–347)
URATE SERPL-MCNC: 7.6 MG/DL (ref 3.4–7)
UROBILINOGEN UR STRIP-ACNC: NORMAL EU/DL (ref 0–1)
VIT B12 SERPL-MCNC: 459 PG/ML (ref 232–1245)
WBC OTHER # BLD: 8.1 K/UL (ref 3.5–11)

## 2023-10-31 PROCEDURE — 82607 VITAMIN B-12: CPT

## 2023-10-31 PROCEDURE — 36415 COLL VENOUS BLD VENIPUNCTURE: CPT

## 2023-10-31 PROCEDURE — 83540 ASSAY OF IRON: CPT

## 2023-10-31 PROCEDURE — 81003 URINALYSIS AUTO W/O SCOPE: CPT

## 2023-10-31 PROCEDURE — 85025 COMPLETE CBC W/AUTO DIFF WBC: CPT

## 2023-10-31 PROCEDURE — 84550 ASSAY OF BLOOD/URIC ACID: CPT

## 2023-10-31 PROCEDURE — 84443 ASSAY THYROID STIM HORMONE: CPT

## 2023-10-31 PROCEDURE — 83735 ASSAY OF MAGNESIUM: CPT

## 2023-10-31 PROCEDURE — 82746 ASSAY OF FOLIC ACID SERUM: CPT

## 2023-10-31 PROCEDURE — 80053 COMPREHEN METABOLIC PANEL: CPT

## 2023-10-31 PROCEDURE — 83550 IRON BINDING TEST: CPT

## 2023-10-31 PROCEDURE — 80061 LIPID PANEL: CPT

## 2023-10-31 NOTE — RESULT ENCOUNTER NOTE
Please notify patient: Hemoglobin A1c 7.6 improved diabetes  Glucose 149 well-controlled  Calcium is high at 10.5--- absolutely stop any calcium supplements including Tums or multivitamin with calcium or vitamin D with calcium  To recheck calcium level with PTH in 1 week, not fasting new order placed    increased eosinophils consistent with allergies  Otherwise labs within normal limits  continue current treatment    Future Appointments  1/12/2024  11:00 AM   MD mario [facility]               [id number]  4/30/2024  9:45 AM    Yvonne Oconnor MD    fp gretel Sweeney

## 2023-12-05 DIAGNOSIS — E11.9 TYPE 2 DIABETES MELLITUS WITHOUT COMPLICATION, WITHOUT LONG-TERM CURRENT USE OF INSULIN (HCC): ICD-10-CM

## 2023-12-06 RX ORDER — GLIMEPIRIDE 2 MG/1
TABLET ORAL
Qty: 90 TABLET | Refills: 0 | Status: SHIPPED | OUTPATIENT
Start: 2023-12-06 | End: 2023-12-08

## 2023-12-06 NOTE — TELEPHONE ENCOUNTER
Please Approve or Refuse.   Send to Pharmacy per Pt's Request:      Next Visit Date:  1/12/2024   Last Visit Date: 8/29/2023    Hemoglobin A1C (%)   Date Value   08/29/2023 7.1   04/18/2023 7.4 (H)   03/31/2022 7.5 (H)             ( goal A1C is < 7)   BP Readings from Last 3 Encounters:   08/29/23 138/70   04/27/23 124/82   12/30/22 130/80          (goal 120/80)  BUN   Date Value Ref Range Status   10/31/2023 18 8 - 23 mg/dL Final     Creatinine   Date Value Ref Range Status   10/31/2023 0.9 0.7 - 1.2 mg/dL Final     Potassium   Date Value Ref Range Status   10/31/2023 4.7 3.7 - 5.3 mmol/L Final

## 2023-12-08 DIAGNOSIS — E11.9 TYPE 2 DIABETES MELLITUS WITHOUT COMPLICATION, WITHOUT LONG-TERM CURRENT USE OF INSULIN (HCC): ICD-10-CM

## 2023-12-08 RX ORDER — GLIMEPIRIDE 2 MG/1
TABLET ORAL
Qty: 90 TABLET | Refills: 0 | Status: SHIPPED | OUTPATIENT
Start: 2023-12-08

## 2023-12-08 NOTE — TELEPHONE ENCOUNTER
Please Approve or Refuse.   Send to Pharmacy per Pt's Request: [facility]      Next Visit Date:  1/12/2024   Last Visit Date: 8/29/2023    Hemoglobin A1C (%)   Date Value   08/29/2023 7.1   04/18/2023 7.4 (H)   03/31/2022 7.5 (H)             ( goal A1C is < 7)   BP Readings from Last 3 Encounters:   08/29/23 138/70   04/27/23 124/82   12/30/22 130/80          (goal 120/80)  BUN   Date Value Ref Range Status   10/31/2023 18 8 - 23 mg/dL Final     Creatinine   Date Value Ref Range Status   10/31/2023 0.9 0.7 - 1.2 mg/dL Final     Potassium   Date Value Ref Range Status   10/31/2023 4.7 3.7 - 5.3 mmol/L Final

## 2024-01-22 SDOH — HEALTH STABILITY: PHYSICAL HEALTH: ON AVERAGE, HOW MANY DAYS PER WEEK DO YOU ENGAGE IN MODERATE TO STRENUOUS EXERCISE (LIKE A BRISK WALK)?: 3 DAYS

## 2024-01-22 SDOH — HEALTH STABILITY: PHYSICAL HEALTH: ON AVERAGE, HOW MANY MINUTES DO YOU ENGAGE IN EXERCISE AT THIS LEVEL?: 60 MIN

## 2024-01-22 ASSESSMENT — PATIENT HEALTH QUESTIONNAIRE - PHQ9
6. FEELING BAD ABOUT YOURSELF - OR THAT YOU ARE A FAILURE OR HAVE LET YOURSELF OR YOUR FAMILY DOWN: 0
5. POOR APPETITE OR OVEREATING: 0
SUM OF ALL RESPONSES TO PHQ9 QUESTIONS 1 & 2: 0
SUM OF ALL RESPONSES TO PHQ QUESTIONS 1-9: 0
4. FEELING TIRED OR HAVING LITTLE ENERGY: 0
SUM OF ALL RESPONSES TO PHQ QUESTIONS 1-9: 0
7. TROUBLE CONCENTRATING ON THINGS, SUCH AS READING THE NEWSPAPER OR WATCHING TELEVISION: 0
SUM OF ALL RESPONSES TO PHQ QUESTIONS 1-9: 0
1. LITTLE INTEREST OR PLEASURE IN DOING THINGS: 0
8. MOVING OR SPEAKING SO SLOWLY THAT OTHER PEOPLE COULD HAVE NOTICED. OR THE OPPOSITE, BEING SO FIGETY OR RESTLESS THAT YOU HAVE BEEN MOVING AROUND A LOT MORE THAN USUAL: 0
9. THOUGHTS THAT YOU WOULD BE BETTER OFF DEAD, OR OF HURTING YOURSELF: 0
10. IF YOU CHECKED OFF ANY PROBLEMS, HOW DIFFICULT HAVE THESE PROBLEMS MADE IT FOR YOU TO DO YOUR WORK, TAKE CARE OF THINGS AT HOME, OR GET ALONG WITH OTHER PEOPLE: 0
SUM OF ALL RESPONSES TO PHQ QUESTIONS 1-9: 0
3. TROUBLE FALLING OR STAYING ASLEEP: 0
2. FEELING DOWN, DEPRESSED OR HOPELESS: 0

## 2024-01-22 ASSESSMENT — LIFESTYLE VARIABLES
HOW OFTEN DO YOU HAVE A DRINK CONTAINING ALCOHOL: 1
HOW OFTEN DO YOU HAVE A DRINK CONTAINING ALCOHOL: NEVER
HOW OFTEN DO YOU HAVE SIX OR MORE DRINKS ON ONE OCCASION: 1
HOW MANY STANDARD DRINKS CONTAINING ALCOHOL DO YOU HAVE ON A TYPICAL DAY: 0
HOW MANY STANDARD DRINKS CONTAINING ALCOHOL DO YOU HAVE ON A TYPICAL DAY: PATIENT DOES NOT DRINK

## 2024-01-25 ENCOUNTER — OFFICE VISIT (OUTPATIENT)
Dept: FAMILY MEDICINE CLINIC | Age: 84
End: 2024-01-25

## 2024-01-25 VITALS
WEIGHT: 191.4 LBS | OXYGEN SATURATION: 98 % | TEMPERATURE: 97.6 F | SYSTOLIC BLOOD PRESSURE: 136 MMHG | HEART RATE: 76 BPM | BODY MASS INDEX: 27.4 KG/M2 | HEIGHT: 70 IN | DIASTOLIC BLOOD PRESSURE: 84 MMHG

## 2024-01-25 DIAGNOSIS — F33.42 RECURRENT MAJOR DEPRESSIVE DISORDER, IN FULL REMISSION (HCC): ICD-10-CM

## 2024-01-25 DIAGNOSIS — I10 ESSENTIAL HYPERTENSION: ICD-10-CM

## 2024-01-25 DIAGNOSIS — Z00.00 MEDICARE ANNUAL WELLNESS VISIT, SUBSEQUENT: Primary | ICD-10-CM

## 2024-01-25 DIAGNOSIS — E79.0 ELEVATED URIC ACID IN BLOOD: ICD-10-CM

## 2024-01-25 DIAGNOSIS — F41.9 ANXIETY: ICD-10-CM

## 2024-01-25 DIAGNOSIS — E11.9 TYPE 2 DIABETES MELLITUS WITHOUT COMPLICATION, WITHOUT LONG-TERM CURRENT USE OF INSULIN (HCC): ICD-10-CM

## 2024-01-25 LAB — HBA1C MFR BLD: 7.6 %

## 2024-01-25 ASSESSMENT — VISUAL ACUITY
OS_CC: 20/25
OD_CC: 20/25

## 2024-01-25 NOTE — PATIENT INSTRUCTIONS
benefits include a comprehensive review of your medical history including lifestyle, illnesses that may run in your family, and various assessments and screenings as appropriate.    After reviewing your medical record and screening and assessments performed today your provider may have ordered immunizations, labs, imaging, and/or referrals for you.  A list of these orders (if applicable) as well as your Preventive Care list are included within your After Visit Summary for your review.    Other Preventive Recommendations:    A preventive eye exam performed by an eye specialist is recommended every 1-2 years to screen for glaucoma; cataracts, macular degeneration, and other eye disorders.  A preventive dental visit is recommended every 6 months.  Try to get at least 150 minutes of exercise per week or 10,000 steps per day on a pedometer .  Order or download the FREE \"Exercise & Physical Activity: Your Everyday Guide\" from [employer]. Call 1-460.512.3084 or search [employer] online.  You need 4664-7770 mg of calcium and 3256-5802 IU of vitamin D per day. It is possible to meet your calcium requirement with diet alone, but a vitamin D supplement is usually necessary to meet this goal.  When exposed to the sun, use a sunscreen that protects against both UVA and UVB radiation with an SPF of 30 or greater. Reapply every 2 to 3 hours or after sweating, drying off with a towel, or swimming.  Always wear a seat belt when traveling in a car. Always wear a helmet when riding a bicycle or motorcycle.

## 2024-01-25 NOTE — PROGRESS NOTES
Medicare Annual Wellness Visit    Jon Fernandez is here for Medicare AWV and Discuss Medications (FLOMAX)    Assessment & Plan   Medicare annual wellness visit, subsequent  Type 2 diabetes mellitus without complication, without long-term current use of insulin (HCC)  -Slightly worsening, but stable  -Was supposed to start Januvia, but could not afford the medication, so he has been taking the Amaryl still and tolerating it well  -Plan for referral to medication management  -Discussed lifestyle modifications  -     POCT glycosylated hemoglobin (Hb A1C)  -     [facility] (Diabetes - Clinical Pharmacy) - [facility]  Essential hypertension  -Controlled  -No change in current regimen  Recurrent major depressive disorder, in full remission (HCC)  -Controlled  -No change in current regimen  Anxiety  -Controlled  -No change in current regimen  Incomplete bladder emptying  -States that his symptoms are pretty well-controlled  -Did offer referral to urologist, which he declines states he used to follow with urology and has been on Flomax  -He has been taking the Flomax every other day instead of every day, and feels like the incomplete bladder emptying is under control    Recommendations for Preventive Services Due: see orders and patient instructions/AVS.  Recommended screening schedule for the next 5-10 years is provided to the patient in written form: see Patient Instructions/AVS.     Return in 3 months (on 4/25/2024).     Subjective   The following acute and/or chronic problems were also addressed today:  Hypertension, diabetes, depression, anxiety.    Patient's complete Health Risk Assessment and screening values have been reviewed and are found in Flowsheets. The following problems were reviewed today and where indicated follow up appointments were made and/or referrals ordered.    Positive Risk Factor Screenings with Interventions:                   Vision Screen:  Do you have difficulty driving,

## 2024-01-25 NOTE — PROGRESS NOTES
Visit Information    Have you changed or started any medications since your last visit including any over-the-counter medicines, vitamins, or herbal medicines? no   Have you stopped taking any of your medications? Is so, why? -  no  Are you having any side effects from any of your medications? - yes -     Have you seen any other physician or provider since your last visit?  no   Have you had any other diagnostic tests since your last visit?  no   Have you been seen in the emergency room and/or had an admission in a hospital since we last saw you?  no   Have you had your routine dental cleaning in the past 6 months?  yes -      Do you have an active MyChart account? If no, what is the barrier?  Yes    Patient Care Team:  Purnima Cifuentes MD as PCP - General (Family Medicine)  Purnima Cifuentes MD as PCP - Empaneled Provider  Aubrey Crenshaw MD (Dermatology)    Medical History Review  Past Medical, Family, and Social History reviewed and does contribute to the patient presenting condition    Health Maintenance   Topic Date Due    Respiratory Syncytial Virus (RSV) Pregnant or age 60 yrs+ (1 - 1-dose 60+ series) Never done    Annual Wellness Visit (Medicare)  04/27/2024    Lipids  10/31/2024    Depression Monitoring  01/22/2025    DTaP/Tdap/Td vaccine (2 - Td or Tdap) 04/18/2026    Flu vaccine  Completed    Shingles vaccine  Completed    Pneumococcal 65+ years Vaccine  Completed    COVID-19 Vaccine  Completed    Hepatitis A vaccine  Aged Out    Hepatitis B vaccine  Aged Out    Hib vaccine  Aged Out    Polio vaccine  Aged Out    Meningococcal (ACWY) vaccine  Aged Out    Hepatitis C screen  Discontinued

## 2024-02-15 ENCOUNTER — HOSPITAL ENCOUNTER (OUTPATIENT)
Dept: PHARMACY | Age: 84
Setting detail: THERAPIES SERIES
Discharge: HOME | End: 2024-02-15
Payer: MEDICARE

## 2024-02-15 VITALS — BODY MASS INDEX: 27.55 KG/M2 | OXYGEN SATURATION: 97 % | HEART RATE: 61 BPM | WEIGHT: 192 LBS

## 2024-02-15 PROCEDURE — 99213 OFFICE O/P EST LOW 20 MIN: CPT | Performed by: PHARMACIST

## 2024-02-15 NOTE — DISCHARGE INSTRUCTIONS
Continue glimepiride 2 mg once daily in the morning with breakfast  Continue metformin 1000 mg twice daily  Continue to check blood sugar and record the readings. Check blood sugar one other time during the day- prior to lunch, 2 hours after any meal, or prior to bed  Get a fast source of sugar and carry with you. Glucose tablets, glucose gel.

## 2024-02-15 NOTE — PROGRESS NOTES
Diabetic Medication Management Program  [facility]    [address]  [place], [place] [address]  Phone: [phone]  Fax: [phone]    NAME: Jon Fernandez  MEDICAL RECORD NUMBER:  [id number]  AGE: 84 y.o.   GENDER: male  : 1940  EPISODE DATE:  2/15/2024       Mr. Fernandez was referred to [facility] by Rayo Davis CNP. Patient acknowledges working in consult agreement with clinical pharmacist and this provider.    Goals per referral:   Fasting blood glucose: < 130  Peak postprandial glucose: < 180  A1C: < 7    Patient Specific Goals:  1. Keeping blood sugar under control       SUBJECTIVE     Mr. Fernandez is a 84 y.o. male here for the Diabetes Service for self-management education, medication review including over the counter medications and herbal products, overall wellbeing assessment, transition of care and any needed adjustments with updates and recommendations communicated to the referring physician.      Patient Findings:   []  Missed doses   []  Emergency Room Visit or Hospitalization    [x]  Medication changes   []  Diet changes   []  Acute illness   []  Activity changes      Symptoms of hypoglycemia   [x]  None    []  Shakiness    []  Lightheadedness or dizziness   []  Confusion      []  Sweating   []  Other       Symptoms of hyperglycemia   [x]  None   []  Frequent urination    []  Increased thirst   []  Other    Medication adverse reactions   [x]  None   []  Diarrhea / Nausea / Vomiting / Constipation / flatulence   []  Hypertension   []  Peripheral edema     []  Signs of infection   []  Headache   []  Vision changes   []  Increased cholesterol    []  Weight gain   []  Change in renal function   []  Increased potassium  []  Other      Comments:      Patient reports he was started on tamsulosin several months ago. States marginal improvement with urinary symptoms on the medication. He noticed increase in blood sugar on the Flomax. He brings in

## 2024-02-16 DIAGNOSIS — I10 ESSENTIAL HYPERTENSION: ICD-10-CM

## 2024-02-16 RX ORDER — ATORVASTATIN CALCIUM 10 MG/1
TABLET, FILM COATED ORAL
Qty: 90 TABLET | Refills: 0 | Status: SHIPPED | OUTPATIENT
Start: 2024-02-16

## 2024-02-16 RX ORDER — ATENOLOL 50 MG/1
TABLET ORAL
Qty: 90 TABLET | Refills: 0 | Status: SHIPPED | OUTPATIENT
Start: 2024-02-16

## 2024-02-16 NOTE — TELEPHONE ENCOUNTER
Please Approve or Refuse.  Send to Pharmacy per Pt's Request:      Next Visit Date:  4/26/2024   Last Visit Date: 1/25/2024    Hemoglobin A1C (%)   Date Value   01/25/2024 7.6   08/29/2023 7.1   04/18/2023 7.4 (H)             ( goal A1C is < 7)   BP Readings from Last 3 Encounters:   01/25/24 136/84   08/29/23 138/70   04/27/23 124/82          (goal 120/80)  BUN   Date Value Ref Range Status   10/31/2023 18 8 - 23 mg/dL Final     Creatinine   Date Value Ref Range Status   10/31/2023 0.9 0.7 - 1.2 mg/dL Final     Potassium   Date Value Ref Range Status   10/31/2023 4.7 3.7 - 5.3 mmol/L Final

## 2024-02-18 DIAGNOSIS — F33.0 MILD EPISODE OF RECURRENT MAJOR DEPRESSIVE DISORDER (HCC): ICD-10-CM

## 2024-02-18 DIAGNOSIS — F41.9 ANXIETY: ICD-10-CM

## 2024-02-19 NOTE — TELEPHONE ENCOUNTER
Please Approve or Refuse.  Send to [facility] per Pt's [facility]      Next Visit Date:  4/26/2024   Last Visit Date: 1/25/2024    Hemoglobin A1C (%)   Date Value   01/25/2024 7.6   08/29/2023 7.1   04/18/2023 7.4 (H)             ( goal A1C is < 7)   BP Readings from Last 3 Encounters:   01/25/24 136/84   08/29/23 138/70   04/27/23 124/82          (goal 120/80)  BUN   Date Value Ref Range Status   10/31/2023 18 8 - 23 mg/dL Final     Creatinine   Date Value Ref Range Status   10/31/2023 0.9 0.7 - 1.2 mg/dL Final     Potassium   Date Value Ref Range Status   10/31/2023 4.7 3.7 - 5.3 mmol/L Final

## 2024-02-21 DIAGNOSIS — I10 ESSENTIAL HYPERTENSION: ICD-10-CM

## 2024-02-21 RX ORDER — LISINOPRIL 40 MG/1
40 TABLET ORAL EVERY MORNING
Qty: 90 TABLET | Refills: 0 | Status: SHIPPED | OUTPATIENT
Start: 2024-02-21

## 2024-02-27 DIAGNOSIS — E11.9 TYPE 2 DIABETES MELLITUS WITHOUT COMPLICATION, WITHOUT LONG-TERM CURRENT USE OF INSULIN (HCC): ICD-10-CM

## 2024-02-27 RX ORDER — GLIMEPIRIDE 2 MG/1
TABLET ORAL
Qty: 90 TABLET | Refills: 3 | Status: SHIPPED | OUTPATIENT
Start: 2024-02-27

## 2024-03-04 ENCOUNTER — HOSPITAL ENCOUNTER (OUTPATIENT)
Dept: PHARMACY | Age: 84
Setting detail: THERAPIES SERIES
Discharge: HOME | End: 2024-03-04
Payer: MEDICARE

## 2024-03-04 VITALS
DIASTOLIC BLOOD PRESSURE: 71 MMHG | SYSTOLIC BLOOD PRESSURE: 141 MMHG | BODY MASS INDEX: 27.41 KG/M2 | OXYGEN SATURATION: 98 % | WEIGHT: 191 LBS | HEART RATE: 58 BPM

## 2024-03-04 PROCEDURE — 99213 OFFICE O/P EST LOW 20 MIN: CPT | Performed by: PHARMACIST

## 2024-03-04 RX ORDER — GLIMEPIRIDE 4 MG/1
4 TABLET ORAL
Qty: 90 TABLET | Refills: 1 | Status: SHIPPED | OUTPATIENT
Start: 2024-03-04

## 2024-03-04 NOTE — DISCHARGE INSTRUCTIONS
Increase glimepiride 4 mg once daily in the morning with breakfast  Use the glimepiride 2 mg tablets (2 of the 2 mg tablets with breakfast) you have at home first. After you use up the 2 mg tablets, you will switch to the 4 mg tablets and only take 1.   Continue metformin 1000 mg twice daily

## 2024-03-04 NOTE — PROGRESS NOTES
Diabetic Medication Management Program  [facility]  [address]   [place], [place] [address]  Phone: [phone]  Fax: [phone]    NAME: Jon Fernandez  MEDICAL RECORD NUMBER:  [id number]  AGE: 84 y.o.   GENDER: male  : 1940  EPISODE DATE:  3/4/2024     Mr. Fernandez was referred to [facility] by Rayo Davis.  Patient acknowledges working in consult agreement with clinical pharmacist and this provider.     Goals per referral:   Fasting blood glucose: < 130  Peak postprandial glucose: < 180  A1C: < 7    SUBJECTIVE     Mr. Fernandez is a 84 y.o. male here for the Diabetes Service for self-management education, medication review including over the counter medications and herbal products, overall wellbeing assessment, transition of care and any needed adjustments with updates and recommendations communicated to the referring physician.       Patient Findings:     Medication changes  no  Diet changes  no  Activity changes  no  Emergency Room Visit or Hospitalization  no  Acute Illness/new problems  no  Symptoms of hypoglycemia  no - none  Symptoms of hyperglycemia  no - none  Medication adverse reactions  none    Comments:   Patient reports he has remained off of Flomax and has no intention of restarting. Reports he thinks frequency of urination has been able the same. Does not think it is any worse off of the Flomax. Reports blood sugars have been much better off of Flomax.     Has been varying the times he is checking blood sugar and brings these to visit. Post prandial and prior to lunch and dinner are slightly elevated. Did review diet. Pt eating higher carb breakfasts. No hypoglycemia     OBJECTIVE     PMHx:    Past Medical History:   Diagnosis Date    Anxiety     Depression 2016    Hyperlipidemia     Hypertension     Mixed hyperlipidemia 2016    Type 2 diabetes mellitus (HCC)     Type 2 diabetes mellitus without complication, without long-term

## 2024-03-19 DIAGNOSIS — E11.9 TYPE 2 DIABETES MELLITUS WITHOUT COMPLICATION, WITHOUT LONG-TERM CURRENT USE OF INSULIN (HCC): ICD-10-CM

## 2024-03-19 RX ORDER — CALCIUM CITRATE/VITAMIN D3 200MG-6.25
TABLET ORAL
Qty: 100 EACH | Refills: 5 | Status: SHIPPED | OUTPATIENT
Start: 2024-03-19

## 2024-04-01 ENCOUNTER — HOSPITAL ENCOUNTER (OUTPATIENT)
Dept: PHARMACY | Age: 84
Setting detail: THERAPIES SERIES
Discharge: HOME | End: 2024-04-01
Payer: MEDICARE

## 2024-04-01 ENCOUNTER — ENROLLMENT (OUTPATIENT)
Dept: PHARMACY | Age: 84
End: 2024-04-01

## 2024-04-01 VITALS
WEIGHT: 190 LBS | DIASTOLIC BLOOD PRESSURE: 67 MMHG | OXYGEN SATURATION: 98 % | SYSTOLIC BLOOD PRESSURE: 134 MMHG | BODY MASS INDEX: 27.26 KG/M2 | HEART RATE: 60 BPM

## 2024-04-01 PROCEDURE — 99212 OFFICE O/P EST SF 10 MIN: CPT | Performed by: PHARMACIST

## 2024-04-01 NOTE — PROGRESS NOTES
are elevated. See above for discussion with patient and wife     ASSESSMENT     Recent A1c: yes    Lab Results   Component Value Date    LABA1C 7.6 2024    LABA1C 7.1 2023    LABA1C 7.4 (H) 2023        Eating patterns: No significant change from last visit except for Easter dinner.   Home cook everything. Do not eat out or fast food  Breakfast time:0730  wake up and eat around 0800. Alternate every 4 days: 1. cereal 2. PB toast with bananas 3. blueberry pancakes with SF syrup 4. Scrambled eggs.    Drinks coffee with 2% milk     Lunch time: 1200-1pm  Soup or will split a sandwich. Fruit  Dinner time: 5-6pm  Sometimes soup, salad  Occasionally Mellow Cream in the summer.     Only water and coffee. No sugary beverages.      Exercise patterns: 3 times weekly at the [facility] for an hour. Walk, stationary bike and some arm exercise machines     Blood Glucose Testin-3 times daily    Current Diabetic Medications:   glimepiride 2 mg once daily in the morning with breakfast  metformin 1000 mg twice daily     Diabetic Regimen/Compliance: yes     Other Medication Compliance: yes    Refills needed (diabetes medications/testing supplies). no    Labs needed (A1c, lipids, microalbumin, SCr etc) Yes: Urine microalbumin    Up to date with eye/foot exams no: Eye exam 24;  Foot exam - does not recall ever having anyone look at his feet    Patient is on appropriate statin, ACE/ARB, and ASA Yes      PLAN     Diabetic Action Plan is shown below. Patient and provider worked together to create goals which patient will work toward prior to the next appointment.    Blood sugar still slightly above target of 7, but is trending. Given age, will continue current medications for another month. Will get A1c at the end of the month to determine if further medications need to be added. Patient is Medicare and states he would most likely not qualify for patient assistance for GLP1 or SGLT2. Patient and wife are hesitant to

## 2024-04-01 NOTE — DISCHARGE INSTRUCTIONS
Continue glimepiride 4 mg once daily in the morning with breakfast  Continue metformin 1000 mg twice daily  Continue to check blood sugar at least once daily. Add a second check several times a week- prior to meals, 2 hours after a meal or prior to bed.  Please call the clinic with any hypoglycemia (blood sugar less than 70) with the medication adjustment

## 2024-04-28 SDOH — HEALTH STABILITY: PHYSICAL HEALTH: ON AVERAGE, HOW MANY DAYS PER WEEK DO YOU ENGAGE IN MODERATE TO STRENUOUS EXERCISE (LIKE A BRISK WALK)?: 3 DAYS

## 2024-04-28 SDOH — HEALTH STABILITY: PHYSICAL HEALTH: ON AVERAGE, HOW MANY MINUTES DO YOU ENGAGE IN EXERCISE AT THIS LEVEL?: 60 MIN

## 2024-05-01 ENCOUNTER — OFFICE VISIT (OUTPATIENT)
Dept: FAMILY MEDICINE CLINIC | Age: 84
End: 2024-05-01

## 2024-05-01 VITALS
SYSTOLIC BLOOD PRESSURE: 120 MMHG | OXYGEN SATURATION: 99 % | HEIGHT: 70 IN | BODY MASS INDEX: 27.14 KG/M2 | HEART RATE: 55 BPM | TEMPERATURE: 97.7 F | WEIGHT: 189.6 LBS | DIASTOLIC BLOOD PRESSURE: 74 MMHG

## 2024-05-01 DIAGNOSIS — E83.52 HYPERCALCEMIA: ICD-10-CM

## 2024-05-01 DIAGNOSIS — E79.0 ELEVATED URIC ACID IN BLOOD: ICD-10-CM

## 2024-05-01 DIAGNOSIS — Z12.5 SCREENING PSA (PROSTATE SPECIFIC ANTIGEN): ICD-10-CM

## 2024-05-01 DIAGNOSIS — I10 ESSENTIAL HYPERTENSION: ICD-10-CM

## 2024-05-01 DIAGNOSIS — R53.83 OTHER FATIGUE: ICD-10-CM

## 2024-05-01 DIAGNOSIS — E11.9 TYPE 2 DIABETES MELLITUS WITHOUT COMPLICATION, WITHOUT LONG-TERM CURRENT USE OF INSULIN (HCC): Primary | ICD-10-CM

## 2024-05-01 DIAGNOSIS — D64.9 ANEMIA, UNSPECIFIED TYPE: ICD-10-CM

## 2024-05-01 DIAGNOSIS — E55.9 VITAMIN D DEFICIENCY: ICD-10-CM

## 2024-05-01 LAB — HBA1C MFR BLD: 7.1 %

## 2024-05-01 SDOH — ECONOMIC STABILITY: INCOME INSECURITY: HOW HARD IS IT FOR YOU TO PAY FOR THE VERY BASICS LIKE FOOD, HOUSING, MEDICAL CARE, AND HEATING?: NOT HARD AT ALL

## 2024-05-01 SDOH — ECONOMIC STABILITY: FOOD INSECURITY: WITHIN THE PAST 12 MONTHS, YOU WORRIED THAT YOUR FOOD WOULD RUN OUT BEFORE YOU GOT MONEY TO BUY MORE.: NEVER TRUE

## 2024-05-01 SDOH — ECONOMIC STABILITY: FOOD INSECURITY: WITHIN THE PAST 12 MONTHS, THE FOOD YOU BOUGHT JUST DIDN'T LAST AND YOU DIDN'T HAVE MONEY TO GET MORE.: NEVER TRUE

## 2024-05-01 ASSESSMENT — ENCOUNTER SYMPTOMS
CONSTIPATION: 0
NAUSEA: 0
VOMITING: 0
ABDOMINAL PAIN: 0
CHEST TIGHTNESS: 0
SHORTNESS OF BREATH: 0
ABDOMINAL DISTENTION: 0
BACK PAIN: 0
COUGH: 0
WHEEZING: 0
DIARRHEA: 0

## 2024-05-01 ASSESSMENT — PATIENT HEALTH QUESTIONNAIRE - PHQ9
6. FEELING BAD ABOUT YOURSELF - OR THAT YOU ARE A FAILURE OR HAVE LET YOURSELF OR YOUR FAMILY DOWN: NOT AT ALL
SUM OF ALL RESPONSES TO PHQ QUESTIONS 1-9: 0
4. FEELING TIRED OR HAVING LITTLE ENERGY: NOT AT ALL
5. POOR APPETITE OR OVEREATING: NOT AT ALL
SUM OF ALL RESPONSES TO PHQ QUESTIONS 1-9: 0
SUM OF ALL RESPONSES TO PHQ9 QUESTIONS 1 & 2: 0
3. TROUBLE FALLING OR STAYING ASLEEP: NOT AT ALL
1. LITTLE INTEREST OR PLEASURE IN DOING THINGS: NOT AT ALL
SUM OF ALL RESPONSES TO PHQ QUESTIONS 1-9: 0
SUM OF ALL RESPONSES TO PHQ QUESTIONS 1-9: 0
7. TROUBLE CONCENTRATING ON THINGS, SUCH AS READING THE NEWSPAPER OR WATCHING TELEVISION: NOT AT ALL
2. FEELING DOWN, DEPRESSED OR HOPELESS: NOT AT ALL
8. MOVING OR SPEAKING SO SLOWLY THAT OTHER PEOPLE COULD HAVE NOTICED. OR THE OPPOSITE, BEING SO FIGETY OR RESTLESS THAT YOU HAVE BEEN MOVING AROUND A LOT MORE THAN USUAL: NOT AT ALL
10. IF YOU CHECKED OFF ANY PROBLEMS, HOW DIFFICULT HAVE THESE PROBLEMS MADE IT FOR YOU TO DO YOUR WORK, TAKE CARE OF THINGS AT HOME, OR GET ALONG WITH OTHER PEOPLE: NOT DIFFICULT AT ALL
9. THOUGHTS THAT YOU WOULD BE BETTER OFF DEAD, OR OF HURTING YOURSELF: NOT AT ALL

## 2024-05-01 NOTE — PROGRESS NOTES
Jon Fernandez (:  1940) is a 84 y.o. male,Established patient, here for evaluation of the following chief complaint(s): Establish Care      ASSESSMENT/PLAN:    Jon received counseling on the following healthy behaviors: nutrition, exercise, and medication adherence  Reviewed prior labs and health maintenance  Discussed use, benefit, and side effects of prescribed medications.   Barriers to medication compliance addressed.   Patient given educational materials - see patient instructions  All patient questions answered.  Patient voiced understanding.  The patient's past medical,surgical, social, and family history as well as his current medications and allergies were reviewed as documented in today's encounter.    Medications, labs, diagnostic studies, consultations and follow-up as documented in this encounter.    Jon was seen today for establish care.    Diagnoses and all orders for this visit:    Type 2 diabetes mellitus without complication, without long-term current use of insulin (HCC)  -Improved  -Continue current regimen  -Continue to follow with medication management  -     POCT glycosylated hemoglobin (Hb A1C)  -     CBC with Auto Differential; Future  -     Comprehensive Metabolic Panel; Future  -     Magnesium; Future  -     Phosphorus; Future  -     Uric Acid; Future    Essential hypertension  -Well-controlled  -No change in plan of care  -     CBC with Auto Differential; Future  -     Comprehensive Metabolic Panel; Future  -     Uric Acid; Future    Anemia, unspecified type  -Unsure if improved or not  -Lab work ordered  -     Iron and TIBC; Future  -     Ferritin; Future    Other fatigue  -Stable  -Lab work ordered  -     CBC with Auto Differential; Future  -     Comprehensive Metabolic Panel; Future  -     Vitamin D 25 Hydroxy; Future  -     Vitamin B12 & Folate; Future  -     TSH; Future  -     Urinalysis with Reflex to Culture; Future    Elevated uric acid in blood  -Unsure if improved

## 2024-05-01 NOTE — PROGRESS NOTES
Visit Information    Have you changed or started any medications since your last visit including any over-the-counter medicines, vitamins, or herbal medicines? no   Have you stopped taking any of your medications? Is so, why? -  no  Are you having any side effects from any of your medications? - no    Have you seen any other physician or provider since your last visit?  no   Have you had any other diagnostic tests since your last visit?  no   Have you been seen in the emergency room and/or had an admission in a hospital since we last saw you?  no   Have you had your routine dental cleaning in the past 6 months?  no     Do you have an active MyChart account? If no, what is the barrier?  Yes    Patient Care Team:  Purnima Cifuentes MD as PCP - General (Family Medicine)  Purnima Cifuentes MD as PCP - Empaneled Provider  Aubrey Crenshaw MD (Dermatology)  Dilia Wallis RPH as Pharmacist (Pharmacist)    Medical History Review  Past Medical, Family, and Social History reviewed and does contribute to the patient presenting condition    Health Maintenance   Topic Date Due    Respiratory Syncytial Virus (RSV) Pregnant or age 60 yrs+ (1 - 1-dose 60+ series) Never done    Annual Wellness Visit (Medicare)  04/27/2024    Lipids  10/31/2024    Depression Monitoring  01/22/2025    DTaP/Tdap/Td vaccine (2 - Td or Tdap) 04/18/2026    Flu vaccine  Completed    Shingles vaccine  Completed    Pneumococcal 65+ years Vaccine  Completed    COVID-19 Vaccine  Completed    Hepatitis A vaccine  Aged Out    Hepatitis B vaccine  Aged Out    Hib vaccine  Aged Out    Polio vaccine  Aged Out    Meningococcal (ACWY) vaccine  Aged Out    Hepatitis C screen  Discontinued

## 2024-05-12 DIAGNOSIS — I10 ESSENTIAL HYPERTENSION: ICD-10-CM

## 2024-05-13 RX ORDER — ATENOLOL 50 MG/1
TABLET ORAL
Qty: 90 TABLET | Refills: 3 | Status: SHIPPED | OUTPATIENT
Start: 2024-05-13

## 2024-05-13 RX ORDER — LISINOPRIL 40 MG/1
40 TABLET ORAL EVERY MORNING
Qty: 90 TABLET | Refills: 3 | Status: SHIPPED | OUTPATIENT
Start: 2024-05-13

## 2024-05-13 RX ORDER — ATORVASTATIN CALCIUM 10 MG/1
TABLET, FILM COATED ORAL
Qty: 90 TABLET | Refills: 3 | Status: SHIPPED | OUTPATIENT
Start: 2024-05-13

## 2024-05-20 ENCOUNTER — HOSPITAL ENCOUNTER (OUTPATIENT)
Dept: PHARMACY | Age: 84
Setting detail: THERAPIES SERIES
Discharge: HOME | End: 2024-05-20
Payer: MEDICARE

## 2024-05-20 VITALS — OXYGEN SATURATION: 96 % | HEART RATE: 63 BPM | BODY MASS INDEX: 27.58 KG/M2 | WEIGHT: 192.2 LBS

## 2024-05-20 PROCEDURE — 99213 OFFICE O/P EST LOW 20 MIN: CPT

## 2024-05-20 NOTE — DISCHARGE INSTRUCTIONS
Continue glimepiride 4 mg once daily in the morning with breakfast  Continue metformin 1000 mg twice daily with meals  Continue to check blood sugar at least once daily before breakfast and add a second check several times a week- prior to evening meal.  Please call the clinic with any hypoglycemia (blood sugar less than 70) with the medication adjustment

## 2024-05-20 NOTE — PROGRESS NOTES
MOUTH EVERY DAY WITH SUPPER. KEEP BLOOD PRESSURE BETWEEN 115/65 /85. KEEP HEART RATE BETWEEN 56 AND 90, Disp: 90 tablet, Rfl: 3    blood glucose test strips (TRUE METRIX BLOOD GLUCOSE TEST) strip, USE ONE TEST STRIP TO TEST BLOOD SUGAR daily, Disp: 100 each, Rfl: 5    glimepiride (AMARYL) 4 MG tablet, Take 1 tablet by mouth every morning (before breakfast), Disp: 90 tablet, Rfl: 1    sertraline (ZOLOFT) 50 MG tablet, TAKE 1 TABLET BY MOUTH IN THE EVENING, Disp: 90 tablet, Rfl: 0    TRUEplus Lancets 33G MISC, test blood sugar  daily fasting, Disp: 100 each, Rfl: 5    tamsulosin (FLOMAX) 0.4 MG capsule, TAKE 1 CAPSULE BY MOUTH EVERY DAY with supper, Disp: 90 capsule, Rfl: 0    metFORMIN (GLUCOPHAGE) 1000 MG tablet, TAKE 1 TABLET BY MOUTH TWO TIMES A DAY WITH MEALS. KEEP GLUCOSE BETWEEN 80  AS DIRECTED, Disp: 180 tablet, Rfl: 3    Immunizations:   Most Recent Immunizations   Administered Date(s) Administered    COVID-19, MODERNA, (2023-24 formula), (age 12y+), IM, 50mcg/0.5mL 10/11/2023    COVID-19, PFIZER Bivalent, DO NOT Dilute, (age 12y+), IM, 30 mcg/0.3 mL 10/18/2022    COVID-19, PFIZER GRAY top, DO NOT Dilute, (age 12 y+), IM, 30 mcg/0.3 mL 07/14/2022    COVID-19, PFIZER PURPLE top, DILUTE for use, (age 12 y+), 30mcg/0.3mL 10/19/2021    Influenza Vaccine, unspecified formulation 01/19/2017    Influenza Whole 10/29/2014    Influenza, FLUAD, (age 65 y+), Adjuvanted, 0.5mL 10/11/2023    Influenza, High Dose (Fluzone 65 yrs and older) 09/26/2018    Influenza, Triv, inactivated, subunit, adjuvanted, IM (Fluad 65 yrs and older) 09/17/2019    Pneumococcal, PCV-13, PREVNAR 13, (age 6w+), IM, 0.5mL 01/19/2017    Pneumococcal, PPSV23, PNEUMOVAX 23, (age 2y+), SC/IM, 0.5mL 06/12/2018    TDaP, ADACEL (age 10y-64y), BOOSTRIX (age 10y+), IM, 0.5mL 04/18/2016    Zoster Recombinant (Shingrix) 04/18/2023       Home Blood Glucose Results: See below                  Blood glucose trends noted:  Fasting controlled.

## 2024-05-23 DIAGNOSIS — F41.9 ANXIETY: ICD-10-CM

## 2024-05-23 DIAGNOSIS — F33.0 MILD EPISODE OF RECURRENT MAJOR DEPRESSIVE DISORDER (HCC): ICD-10-CM

## 2024-05-23 NOTE — TELEPHONE ENCOUNTER
Please Approve or Refuse.  Send to Pharmacy per Pt's Request:      Next Visit Date:  9/4/2024   Last Visit Date: 5/1/2024    Hemoglobin A1C (%)   Date Value   05/01/2024 7.1   01/25/2024 7.6   08/29/2023 7.1             ( goal A1C is < 7)   BP Readings from Last 3 Encounters:   05/01/24 120/74   04/01/24 134/67   03/04/24 (!) 141/71          (goal 120/80)  BUN   Date Value Ref Range Status   10/31/2023 18 8 - 23 mg/dL Final     Creatinine   Date Value Ref Range Status   10/31/2023 0.9 0.7 - 1.2 mg/dL Final     Potassium   Date Value Ref Range Status   10/31/2023 4.7 3.7 - 5.3 mmol/L Final

## 2024-05-27 DIAGNOSIS — E11.9 TYPE 2 DIABETES MELLITUS WITHOUT COMPLICATION, WITHOUT LONG-TERM CURRENT USE OF INSULIN (HCC): ICD-10-CM

## 2024-07-19 DIAGNOSIS — R33.9 INCOMPLETE BLADDER EMPTYING: ICD-10-CM

## 2024-07-19 RX ORDER — TAMSULOSIN HYDROCHLORIDE 0.4 MG/1
CAPSULE ORAL
Qty: 90 CAPSULE | Refills: 0 | Status: SHIPPED | OUTPATIENT
Start: 2024-07-19

## 2024-07-19 RX ORDER — TAMSULOSIN HYDROCHLORIDE 0.4 MG/1
CAPSULE ORAL
Qty: 90 CAPSULE | Refills: 0 | Status: SHIPPED | OUTPATIENT
Start: 2024-07-19 | End: 2024-07-19

## 2024-07-29 DIAGNOSIS — F41.9 ANXIETY: ICD-10-CM

## 2024-07-29 DIAGNOSIS — F33.0 MILD EPISODE OF RECURRENT MAJOR DEPRESSIVE DISORDER (HCC): ICD-10-CM

## 2024-08-01 ENCOUNTER — PHARMACY VISIT (OUTPATIENT)
Age: 84
End: 2024-08-01
Payer: MEDICARE

## 2024-08-01 ENCOUNTER — HOSPITAL ENCOUNTER (OUTPATIENT)
Age: 84
Setting detail: SPECIMEN
Discharge: HOME | End: 2024-08-01
Payer: MEDICARE

## 2024-08-01 VITALS
WEIGHT: 191 LBS | DIASTOLIC BLOOD PRESSURE: 70 MMHG | SYSTOLIC BLOOD PRESSURE: 145 MMHG | BODY MASS INDEX: 27.41 KG/M2 | OXYGEN SATURATION: 98 % | HEART RATE: 58 BPM

## 2024-08-01 DIAGNOSIS — E11.00 TYPE 2 DIABETES MELLITUS WITH HYPEROSMOLARITY WITHOUT COMA, WITHOUT LONG-TERM CURRENT USE OF INSULIN (HCC): Primary | ICD-10-CM

## 2024-08-01 LAB
CREAT UR-MCNC: 102 MG/DL (ref 39–259)
EST. AVERAGE GLUCOSE BLD GHB EST-MCNC: 163 MG/DL
HBA1C MFR BLD: 7.3 % (ref 4–6)
MICROALBUMIN UR-MCNC: <12 MG/L (ref 0–20)
MICROALBUMIN/CREAT UR-RTO: NORMAL MCG/MG CREAT (ref 0–17)

## 2024-08-01 PROCEDURE — 82570 ASSAY OF URINE CREATININE: CPT

## 2024-08-01 PROCEDURE — 36415 COLL VENOUS BLD VENIPUNCTURE: CPT

## 2024-08-01 PROCEDURE — 99212 OFFICE O/P EST SF 10 MIN: CPT

## 2024-08-01 PROCEDURE — 82043 UR ALBUMIN QUANTITATIVE: CPT

## 2024-08-01 PROCEDURE — 83036 HEMOGLOBIN GLYCOSYLATED A1C: CPT

## 2024-08-01 NOTE — PROGRESS NOTES
Diabetic Medication Management Program  [facility]  [address]   [place], [place] [address]  Phone: [phone]  Fax: [phone]    NAME: Jon Fernandez  MEDICAL RECORD NUMBER:  [id number]  AGE: 84 y.o.   GENDER: male  : 1940  EPISODE DATE:  2024     Mr. Fernandez was referred to [place] by  Rayo Davis.  Patient acknowledges working in consult agreement with clinical pharmacist and this provider.     Goals per referral:   Fasting blood glucose: < 130  Peak postprandial glucose: < 180  A1C: < 7    SUBJECTIVE     Mr. Fernandez is a 84 y.o. male here for the Diabetes Service for self-management education, medication review including over the counter medications and herbal products, overall wellbeing assessment, transition of care and any needed adjustments with updates and recommendations communicated to the referring physician.       Patient Findings:     Medication changes  no  Diet changes  no  Activity changes  no  Emergency Room Visit or Hospitalization  no  Acute Illness/new problems  no  Symptoms of hypoglycemia  no - none  Symptoms of hyperglycemia  no - none  Medication adverse reactions  none    Comments:   Labs  A1c  Urine Micro-albumen pending today  Weight 191 lb  /70  Pulse 58  O2 Sat 98%    Exercise:  [employer] 3x weekly  Lights weights and exercise bike  (One Hour)  Diet  BF  Cereal/PB Toast/ eggs/Coffee with  milk  Lunch : Ham/cheese sandwich, grapes, tomatoes, iced tea  Dinner: Chicken Noodle Soup, Pears, Cheese  Snack   Popcorn     OBJECTIVE     PMHx:    Past Medical History:   Diagnosis Date    Anxiety     Depression 2016    Hyperlipidemia     Hypertension     Mixed hyperlipidemia 2016    Type 2 diabetes mellitus (HCC)     Type 2 diabetes mellitus without complication, without long-term current use of insulin (HCC) 2016       Social History:    Social History     Tobacco Use    Smoking status: Never     Passive

## 2024-08-27 RX ORDER — GLIMEPIRIDE 4 MG/1
4 TABLET ORAL
Qty: 90 TABLET | Refills: 0 | Status: SHIPPED | OUTPATIENT
Start: 2024-08-27

## 2024-08-27 NOTE — TELEPHONE ENCOUNTER
Please Approve or Refuse.  Send to Pharmacy per Pt's Request: [facility]     Next Visit Date:  9/4/2024   Last Visit Date: 5/1/2024    Hemoglobin A1C (%)   Date Value   08/01/2024 7.3 (H)   05/01/2024 7.1   01/25/2024 7.6             ( goal A1C is < 7)   BP Readings from Last 3 Encounters:   08/01/24 (!) 145/70   05/01/24 120/74   04/01/24 134/67          (goal 120/80)  BUN   Date Value Ref Range Status   10/31/2023 18 8 - 23 mg/dL Final     Creatinine   Date Value Ref Range Status   10/31/2023 0.9 0.7 - 1.2 mg/dL Final     Potassium   Date Value Ref Range Status   10/31/2023 4.7 3.7 - 5.3 mmol/L Final

## 2024-08-29 ENCOUNTER — HOSPITAL ENCOUNTER (OUTPATIENT)
Age: 84
Discharge: HOME | End: 2024-08-29
Payer: MEDICARE

## 2024-08-29 DIAGNOSIS — E79.0 ELEVATED URIC ACID IN BLOOD: ICD-10-CM

## 2024-08-29 DIAGNOSIS — E83.52 HYPERCALCEMIA: ICD-10-CM

## 2024-08-29 DIAGNOSIS — D64.9 ANEMIA, UNSPECIFIED TYPE: ICD-10-CM

## 2024-08-29 DIAGNOSIS — E11.9 TYPE 2 DIABETES MELLITUS WITHOUT COMPLICATION, WITHOUT LONG-TERM CURRENT USE OF INSULIN (HCC): ICD-10-CM

## 2024-08-29 DIAGNOSIS — Z12.5 SCREENING PSA (PROSTATE SPECIFIC ANTIGEN): ICD-10-CM

## 2024-08-29 DIAGNOSIS — R53.83 OTHER FATIGUE: ICD-10-CM

## 2024-08-29 DIAGNOSIS — I10 ESSENTIAL HYPERTENSION: ICD-10-CM

## 2024-08-29 DIAGNOSIS — E55.9 VITAMIN D DEFICIENCY: ICD-10-CM

## 2024-08-29 LAB
25(OH)D3 SERPL-MCNC: 29.3 NG/ML (ref 30–100)
ALBUMIN SERPL-MCNC: 4.4 G/DL (ref 3.5–5.2)
ALP SERPL-CCNC: 74 U/L (ref 40–129)
ALT SERPL-CCNC: 21 U/L (ref 5–41)
ANION GAP SERPL CALCULATED.3IONS-SCNC: 10 MMOL/L (ref 9–17)
AST SERPL-CCNC: 16 U/L
BASOPHILS # BLD: 0.1 K/UL (ref 0–0.2)
BASOPHILS NFR BLD: 1 % (ref 0–2)
BILIRUB SERPL-MCNC: 0.5 MG/DL (ref 0.3–1.2)
BILIRUB UR QL STRIP: NEGATIVE
BUN SERPL-MCNC: 19 MG/DL (ref 8–23)
CA-I BLD-SCNC: 1.35 MMOL/L (ref 1.13–1.33)
CALCIUM SERPL-MCNC: 10.1 MG/DL (ref 8.6–10.4)
CHLORIDE SERPL-SCNC: 103 MMOL/L (ref 98–107)
CLARITY UR: CLEAR
CO2 SERPL-SCNC: 27 MMOL/L (ref 20–31)
COLOR UR: YELLOW
COMMENT: NORMAL
CREAT SERPL-MCNC: 1.1 MG/DL (ref 0.7–1.2)
EOSINOPHIL # BLD: 0.5 K/UL (ref 0–0.4)
EOSINOPHILS RELATIVE PERCENT: 6 % (ref 0–4)
ERYTHROCYTE [DISTWIDTH] IN BLOOD BY AUTOMATED COUNT: 14.2 % (ref 11.5–14.9)
FERRITIN SERPL-MCNC: 72 NG/ML (ref 30–400)
FOLATE SERPL-MCNC: 9.1 NG/ML (ref 4.8–24.2)
GFR, ESTIMATED: 67 ML/MIN/1.73M2
GLUCOSE SERPL-MCNC: 120 MG/DL (ref 70–99)
GLUCOSE UR STRIP-MCNC: NEGATIVE MG/DL
HCT VFR BLD AUTO: 42.6 % (ref 41–53)
HGB BLD-MCNC: 14.1 G/DL (ref 13.5–17.5)
HGB UR QL STRIP.AUTO: NEGATIVE
IRON SATN MFR SERPL: 25 % (ref 20–55)
IRON SERPL-MCNC: 82 UG/DL (ref 61–157)
KETONES UR STRIP-MCNC: NEGATIVE MG/DL
LEUKOCYTE ESTERASE UR QL STRIP: NEGATIVE
LYMPHOCYTES NFR BLD: 2.7 K/UL (ref 1–4.8)
LYMPHOCYTES RELATIVE PERCENT: 31 % (ref 24–44)
MAGNESIUM SERPL-MCNC: 1.7 MG/DL (ref 1.6–2.6)
MCH RBC QN AUTO: 29.6 PG (ref 26–34)
MCHC RBC AUTO-ENTMCNC: 33.2 G/DL (ref 31–37)
MCV RBC AUTO: 89.3 FL (ref 80–100)
MONOCYTES NFR BLD: 0.6 K/UL (ref 0.1–1.3)
MONOCYTES NFR BLD: 7 % (ref 1–7)
NEUTROPHILS NFR BLD: 55 % (ref 36–66)
NEUTS SEG NFR BLD: 5 K/UL (ref 1.3–9.1)
NITRITE UR QL STRIP: NEGATIVE
PH UR STRIP: 6.5 [PH] (ref 5–8)
PHOSPHATE SERPL-MCNC: 3.8 MG/DL (ref 2.5–4.5)
PLATELET # BLD AUTO: 237 K/UL (ref 150–450)
PMV BLD AUTO: 8.2 FL (ref 6–12)
POTASSIUM SERPL-SCNC: 5.1 MMOL/L (ref 3.7–5.3)
PROT SERPL-MCNC: 7.3 G/DL (ref 6.4–8.3)
PROT UR STRIP-MCNC: NEGATIVE MG/DL
PSA SERPL-MCNC: 2 NG/ML (ref 0–4)
PTH-INTACT SERPL-MCNC: 50 PG/ML (ref 15–65)
RBC # BLD AUTO: 4.77 M/UL (ref 4.5–5.9)
SODIUM SERPL-SCNC: 140 MMOL/L (ref 135–144)
SP GR UR STRIP: 1.01 (ref 1–1.03)
TIBC SERPL-MCNC: 333 UG/DL (ref 250–450)
TSH SERPL DL<=0.05 MIU/L-ACNC: 2.91 UIU/ML (ref 0.3–5)
UNSATURATED IRON BINDING CAPACITY: 251 UG/DL (ref 112–347)
URATE SERPL-MCNC: 7.9 MG/DL (ref 3.4–7)
UROBILINOGEN UR STRIP-ACNC: NORMAL EU/DL (ref 0–1)
VIT B12 SERPL-MCNC: 319 PG/ML (ref 232–1245)
WBC OTHER # BLD: 8.9 K/UL (ref 3.5–11)

## 2024-08-29 PROCEDURE — 80053 COMPREHEN METABOLIC PANEL: CPT

## 2024-08-29 PROCEDURE — 83970 ASSAY OF PARATHORMONE: CPT

## 2024-08-29 PROCEDURE — 83550 IRON BINDING TEST: CPT

## 2024-08-29 PROCEDURE — 82746 ASSAY OF FOLIC ACID SERUM: CPT

## 2024-08-29 PROCEDURE — 84550 ASSAY OF BLOOD/URIC ACID: CPT

## 2024-08-29 PROCEDURE — 81003 URINALYSIS AUTO W/O SCOPE: CPT

## 2024-08-29 PROCEDURE — 82306 VITAMIN D 25 HYDROXY: CPT

## 2024-08-29 PROCEDURE — 82330 ASSAY OF CALCIUM: CPT

## 2024-08-29 PROCEDURE — G0103 PSA SCREENING: HCPCS

## 2024-08-29 PROCEDURE — 84443 ASSAY THYROID STIM HORMONE: CPT

## 2024-08-29 PROCEDURE — 82607 VITAMIN B-12: CPT

## 2024-08-29 PROCEDURE — 83540 ASSAY OF IRON: CPT

## 2024-08-29 PROCEDURE — 84100 ASSAY OF PHOSPHORUS: CPT

## 2024-08-29 PROCEDURE — 36415 COLL VENOUS BLD VENIPUNCTURE: CPT

## 2024-08-29 PROCEDURE — 82728 ASSAY OF FERRITIN: CPT

## 2024-08-29 PROCEDURE — 85025 COMPLETE CBC W/AUTO DIFF WBC: CPT

## 2024-08-29 PROCEDURE — 83735 ASSAY OF MAGNESIUM: CPT

## 2024-08-30 DIAGNOSIS — E11.9 TYPE 2 DIABETES MELLITUS WITHOUT COMPLICATION, WITHOUT LONG-TERM CURRENT USE OF INSULIN (HCC): ICD-10-CM

## 2024-09-01 SDOH — HEALTH STABILITY: PHYSICAL HEALTH: ON AVERAGE, HOW MANY DAYS PER WEEK DO YOU ENGAGE IN MODERATE TO STRENUOUS EXERCISE (LIKE A BRISK WALK)?: 3 DAYS

## 2024-09-01 SDOH — HEALTH STABILITY: PHYSICAL HEALTH: ON AVERAGE, HOW MANY MINUTES DO YOU ENGAGE IN EXERCISE AT THIS LEVEL?: 60 MIN

## 2024-09-01 ASSESSMENT — PATIENT HEALTH QUESTIONNAIRE - PHQ9
5. POOR APPETITE OR OVEREATING: NOT AT ALL
SUM OF ALL RESPONSES TO PHQ QUESTIONS 1-9: 0
6. FEELING BAD ABOUT YOURSELF - OR THAT YOU ARE A FAILURE OR HAVE LET YOURSELF OR YOUR FAMILY DOWN: NOT AT ALL
7. TROUBLE CONCENTRATING ON THINGS, SUCH AS READING THE NEWSPAPER OR WATCHING TELEVISION: NOT AT ALL
SUM OF ALL RESPONSES TO PHQ QUESTIONS 1-9: 0
1. LITTLE INTEREST OR PLEASURE IN DOING THINGS: NOT AT ALL
4. FEELING TIRED OR HAVING LITTLE ENERGY: NOT AT ALL
2. FEELING DOWN, DEPRESSED OR HOPELESS: NOT AT ALL
SUM OF ALL RESPONSES TO PHQ QUESTIONS 1-9: 0
SUM OF ALL RESPONSES TO PHQ9 QUESTIONS 1 & 2: 0
SUM OF ALL RESPONSES TO PHQ QUESTIONS 1-9: 0
9. THOUGHTS THAT YOU WOULD BE BETTER OFF DEAD, OR OF HURTING YOURSELF: NOT AT ALL
8. MOVING OR SPEAKING SO SLOWLY THAT OTHER PEOPLE COULD HAVE NOTICED. OR THE OPPOSITE, BEING SO FIGETY OR RESTLESS THAT YOU HAVE BEEN MOVING AROUND A LOT MORE THAN USUAL: NOT AT ALL
3. TROUBLE FALLING OR STAYING ASLEEP: NOT AT ALL

## 2024-09-01 ASSESSMENT — LIFESTYLE VARIABLES
HOW OFTEN DO YOU HAVE A DRINK CONTAINING ALCOHOL: 1
HOW OFTEN DO YOU HAVE A DRINK CONTAINING ALCOHOL: NEVER
HOW MANY STANDARD DRINKS CONTAINING ALCOHOL DO YOU HAVE ON A TYPICAL DAY: 0
HOW OFTEN DO YOU HAVE SIX OR MORE DRINKS ON ONE OCCASION: 1
HOW MANY STANDARD DRINKS CONTAINING ALCOHOL DO YOU HAVE ON A TYPICAL DAY: PATIENT DOES NOT DRINK

## 2024-09-03 DIAGNOSIS — E11.9 TYPE 2 DIABETES MELLITUS WITHOUT COMPLICATION, WITHOUT LONG-TERM CURRENT USE OF INSULIN (HCC): ICD-10-CM

## 2024-09-03 NOTE — TELEPHONE ENCOUNTER
Please Approve or Refuse.  Send to Pharmacy per Pt's Request:      Next Visit Date:  9/4/2024   Last Visit Date: 5/1/2024    Hemoglobin A1C (%)   Date Value   08/01/2024 7.3 (H)   05/01/2024 7.1   01/25/2024 7.6             ( goal A1C is < 7)   BP Readings from Last 3 Encounters:   08/01/24 (!) 145/70   05/01/24 120/74   04/01/24 134/67          (goal 120/80)  BUN   Date Value Ref Range Status   08/29/2024 19 8 - 23 mg/dL Final     Creatinine   Date Value Ref Range Status   08/29/2024 1.1 0.7 - 1.2 mg/dL Final     Potassium   Date Value Ref Range Status   08/29/2024 5.1 3.7 - 5.3 mmol/L Final

## 2024-09-04 ENCOUNTER — OFFICE VISIT (OUTPATIENT)
Dept: FAMILY MEDICINE CLINIC | Age: 84
End: 2024-09-04

## 2024-09-04 VITALS
WEIGHT: 189.8 LBS | BODY MASS INDEX: 27.17 KG/M2 | SYSTOLIC BLOOD PRESSURE: 136 MMHG | HEIGHT: 70 IN | DIASTOLIC BLOOD PRESSURE: 74 MMHG | OXYGEN SATURATION: 97 % | HEART RATE: 58 BPM | TEMPERATURE: 97.6 F

## 2024-09-04 DIAGNOSIS — F41.9 ANXIETY: ICD-10-CM

## 2024-09-04 DIAGNOSIS — Z23 NEED FOR INFLUENZA VACCINATION: ICD-10-CM

## 2024-09-04 DIAGNOSIS — Z00.00 MEDICARE ANNUAL WELLNESS VISIT, SUBSEQUENT: Primary | ICD-10-CM

## 2024-09-04 DIAGNOSIS — I10 ESSENTIAL HYPERTENSION: ICD-10-CM

## 2024-09-04 DIAGNOSIS — E83.52 HIGH CALCIUM LEVELS: ICD-10-CM

## 2024-09-04 DIAGNOSIS — E11.9 TYPE 2 DIABETES MELLITUS WITHOUT COMPLICATION, WITHOUT LONG-TERM CURRENT USE OF INSULIN (HCC): ICD-10-CM

## 2024-09-04 DIAGNOSIS — E55.9 VITAMIN D DEFICIENCY: ICD-10-CM

## 2024-09-04 RX ORDER — CHOLECALCIFEROL (VITAMIN D3) 50 MCG
2000 TABLET ORAL DAILY
Qty: 30 TABLET | Refills: 3 | Status: SHIPPED | OUTPATIENT
Start: 2024-09-04

## 2024-09-04 ASSESSMENT — VISUAL ACUITY
OS_CC: 20/30
OD_CC: 20/15

## 2024-09-04 NOTE — PROGRESS NOTES
Medicare Annual Wellness Visit    Jon Fernandez is here for Diabetes, Immunizations (Will like flu vaccine ), and Medicare AWV    Assessment & Plan   Medicare annual wellness visit, subsequent  Vitamin D deficiency  -Unsure if improved or not  -Labs ordered  -     vitamin D (CHOLECALCIFEROL) 50 MCG (2000 UT) TABS tablet; Take 1 tablet by mouth daily, Disp-30 tablet, R-3Normal  High calcium levels  -Previously high  -Borderline high ionized calcium  -Repeat in 3 months   -     PTH, Intact with Ionized Calcium; Future  Need for influenza vaccination  Essential hypertension  -Controlled  -No change in plan of care   Anxiety  -Controlled  -Continue current regimen   Type 2 diabetes mellitus without complication, without long-term current use of insulin ([facility])  Lab Results   Component Value Date    LABA1C 7.3 (H) 08/01/2024    LABA1C 7.1 05/01/2024    LABA1C 7.6 01/25/2024   -Controlled  -Continue current regimen     Recommendations for Preventive Services Due: see orders and patient instructions/AVS.  Recommended screening schedule for the next 5-10 years is provided to the patient in written form: see Patient Instructions/AVS.     Return in 3 months (on 12/4/2024) for a1c check .     Subjective   The following acute and/or chronic problems were also addressed today: Vitamin D deficiency, hypertension, anxiety, diabetes    Patient's complete Health Risk Assessment and screening values have been reviewed and are found in Flowsheets. The following problems were reviewed today and where indicated follow up appointments were made and/or referrals ordered.    Positive Risk Factor Screenings with Interventions:                     Vision Screen:  Visual Acuity screen is abnormal due to a score of 20/25 or worse.  Interventions:   Patient encouraged to make appointment with their eye specialist  Going to [facility] about 6 months ago       Advanced Directives:  Do you have a Living Will?: (!) No    Intervention:  Has

## 2024-09-04 NOTE — PROGRESS NOTES
Visit Information    Have you changed or started any medications since your last visit including any over-the-counter medicines, vitamins, or herbal medicines? no   Have you stopped taking any of your medications? Is so, why? -  no  Are you having any side effects from any of your medications? - no    Have you seen any other physician or provider since your last visit?  yes -    Have you had any other diagnostic tests since your last visit?  no   Have you been seen in the emergency room and/or had an admission in a hospital since we last saw you?  no   Have you had your routine dental cleaning in the past 6 months?  no     Do you have an active MyChart account? If no, what is the barrier?  Yes    Patient Care Team:  Rayo Davis APRN - CNP as PCP - General (Nurse Practitioner)  Rayo Davis APRN - CNP as PCP - Empaneled Provider  Aubrey Crenshaw MD (Dermatology)    Medical History Review  Past Medical, Family, and Social History reviewed and does contribute to the patient presenting condition    Health Maintenance   Topic Date Due    Respiratory Syncytial Virus (RSV) Pregnant or age 60 yrs+ (1 - 1-dose 60+ series) Never done    Annual Wellness Visit (Medicare)  04/27/2024    Flu vaccine (1) 08/01/2024    COVID-19 Vaccine (7 - 2023-24 season) 09/01/2024    Lipids  10/31/2024    Depression Monitoring  09/01/2025    DTaP/Tdap/Td vaccine (2 - Td or Tdap) 04/18/2026    Shingles vaccine  Completed    Pneumococcal 65+ years Vaccine  Completed    Hepatitis A vaccine  Aged Out    Hepatitis B vaccine  Aged Out    Hib vaccine  Aged Out    Polio vaccine  Aged Out    Meningococcal (ACWY) vaccine  Aged Out    Hepatitis C screen  Discontinued

## 2024-09-05 ENCOUNTER — TELEPHONE (OUTPATIENT)
Age: 84
End: 2024-09-05

## 2024-09-05 NOTE — TELEPHONE ENCOUNTER
Patient's wife called to cancel his appt for diabetic medication management on 9/12.  Appt cancelled.  Called patient back to reschedule and patient indicates they are no longer interested in our services.  No reason given. Will inform referring provider and discharge from our service.   Joselyn Camejo [facility],Pharm.D,, BCPS, CACP  9/5/2024  4:36 PM

## 2024-10-13 DIAGNOSIS — R33.9 INCOMPLETE BLADDER EMPTYING: ICD-10-CM

## 2024-10-14 RX ORDER — TAMSULOSIN HYDROCHLORIDE 0.4 MG/1
CAPSULE ORAL
Qty: 90 CAPSULE | Refills: 0 | Status: SHIPPED | OUTPATIENT
Start: 2024-10-14

## 2024-10-14 NOTE — TELEPHONE ENCOUNTER
Please Approve or Refuse.  Send to Pharmacy per Pt's Request:      Next Visit Date:  12/4/2024   Last Visit Date: 9/4/2024    Hemoglobin A1C (%)   Date Value   08/01/2024 7.3 (H)   05/01/2024 7.1   01/25/2024 7.6             ( goal A1C is < 7)   BP Readings from Last 3 Encounters:   09/04/24 136/74   08/01/24 (!) 145/70   05/01/24 120/74          (goal 120/80)  BUN   Date Value Ref Range Status   08/29/2024 19 8 - 23 mg/dL Final     Creatinine   Date Value Ref Range Status   08/29/2024 1.1 0.7 - 1.2 mg/dL Final     Potassium   Date Value Ref Range Status   08/29/2024 5.1 3.7 - 5.3 mmol/L Final

## 2024-10-28 DIAGNOSIS — E11.9 TYPE 2 DIABETES MELLITUS WITHOUT COMPLICATION, WITHOUT LONG-TERM CURRENT USE OF INSULIN (HCC): ICD-10-CM

## 2024-10-28 RX ORDER — GLUCOSAM/CHON-MSM1/C/MANG/BOSW 500-416.6
TABLET ORAL
Qty: 300 EACH | Refills: 0 | Status: SHIPPED | OUTPATIENT
Start: 2024-10-28

## 2024-10-28 NOTE — TELEPHONE ENCOUNTER
Please Approve or Refuse.  [facility]     Next Visit Date:  12/4/2024   Last Visit Date: 9/4/2024    Hemoglobin A1C (%)   Date Value   08/01/2024 7.3 (H)   05/01/2024 7.1   01/25/2024 7.6             ( goal A1C is < 7)   BP Readings from Last 3 Encounters:   09/04/24 136/74   08/01/24 (!) 145/70   05/01/24 120/74          (goal 120/80)  BUN   Date Value Ref Range Status   08/29/2024 19 8 - 23 mg/dL Final     Creatinine   Date Value Ref Range Status   08/29/2024 1.1 0.7 - 1.2 mg/dL Final     Potassium   Date Value Ref Range Status   08/29/2024 5.1 3.7 - 5.3 mmol/L Final

## 2024-11-22 DIAGNOSIS — F33.0 MILD EPISODE OF RECURRENT MAJOR DEPRESSIVE DISORDER (HCC): ICD-10-CM

## 2024-11-22 DIAGNOSIS — F41.9 ANXIETY: ICD-10-CM

## 2024-11-24 DIAGNOSIS — E11.9 TYPE 2 DIABETES MELLITUS WITHOUT COMPLICATION, WITHOUT LONG-TERM CURRENT USE OF INSULIN (HCC): ICD-10-CM

## 2024-11-25 RX ORDER — GLIMEPIRIDE 4 MG/1
4 TABLET ORAL
Qty: 90 TABLET | Refills: 0 | Status: SHIPPED | OUTPATIENT
Start: 2024-11-25

## 2024-11-25 NOTE — TELEPHONE ENCOUNTER
Please Approve or Refuse.  Send to Pharmacy per Pt's Request: [facility]     Next Visit Date:  11/24/2024   Last Visit Date: 9/4/2024    Hemoglobin A1C (%)   Date Value   08/01/2024 7.3 (H)   05/01/2024 7.1   01/25/2024 7.6             ( goal A1C is < 7)   BP Readings from Last 3 Encounters:   09/04/24 136/74   08/01/24 (!) 145/70   05/01/24 120/74          (goal 120/80)  BUN   Date Value Ref Range Status   08/29/2024 19 8 - 23 mg/dL Final     Creatinine   Date Value Ref Range Status   08/29/2024 1.1 0.7 - 1.2 mg/dL Final     Potassium   Date Value Ref Range Status   08/29/2024 5.1 3.7 - 5.3 mmol/L Final

## 2024-12-04 ENCOUNTER — OFFICE VISIT (OUTPATIENT)
Dept: FAMILY MEDICINE CLINIC | Age: 84
End: 2024-12-04

## 2024-12-04 VITALS
RESPIRATION RATE: 16 BRPM | DIASTOLIC BLOOD PRESSURE: 76 MMHG | SYSTOLIC BLOOD PRESSURE: 118 MMHG | WEIGHT: 191 LBS | BODY MASS INDEX: 27.41 KG/M2 | OXYGEN SATURATION: 98 % | HEART RATE: 65 BPM

## 2024-12-04 DIAGNOSIS — E78.2 MIXED HYPERLIPIDEMIA: ICD-10-CM

## 2024-12-04 DIAGNOSIS — E11.9 TYPE 2 DIABETES MELLITUS WITHOUT COMPLICATION, WITHOUT LONG-TERM CURRENT USE OF INSULIN (HCC): Primary | ICD-10-CM

## 2024-12-04 DIAGNOSIS — I10 ESSENTIAL HYPERTENSION: ICD-10-CM

## 2024-12-04 LAB — HBA1C MFR BLD: 7.6 %

## 2024-12-04 ASSESSMENT — ENCOUNTER SYMPTOMS
CONSTIPATION: 0
DIARRHEA: 0
CHEST TIGHTNESS: 0
ABDOMINAL PAIN: 0
WHEEZING: 0
COUGH: 0
ABDOMINAL DISTENTION: 0
NAUSEA: 0
BACK PAIN: 0
SHORTNESS OF BREATH: 0
VOMITING: 0

## 2024-12-04 NOTE — PROGRESS NOTES
normal.      Nose: Nose normal. No mucosal edema.      Mouth/Throat:      Mouth: Mucous membranes are moist.   Eyes:      General: Lids are normal. No scleral icterus.        Right eye: No discharge.         Left eye: No discharge.      Extraocular Movements: Extraocular movements intact.      Conjunctiva/sclera: Conjunctivae normal.   Neck:      Thyroid: No thyromegaly.   Cardiovascular:      Rate and Rhythm: Normal rate and regular rhythm.      Heart sounds: Normal heart sounds. No murmur heard.  Pulmonary:      Effort: Pulmonary effort is normal. No respiratory distress.      Breath sounds: Normal breath sounds. No wheezing or rales.   Chest:      Chest wall: No tenderness.   Abdominal:      General: Bowel sounds are normal. There is no distension.      Palpations: Abdomen is soft.      Tenderness: There is no abdominal tenderness.   Musculoskeletal:         General: No tenderness. Normal range of motion.      Cervical back: Normal range of motion and neck supple.      Right lower leg: No edema.      Left lower leg: No edema.   Lymphadenopathy:      Cervical: No cervical adenopathy.   Skin:     General: Skin is warm and dry.      Capillary Refill: Capillary refill takes less than 2 seconds.      Findings: No rash.   Neurological:      General: No focal deficit present.      Mental Status: He is alert and oriented to person, place, and time.      Cranial Nerves: No cranial nerve deficit.      Sensory: No sensory deficit.      Motor: No abnormal muscle tone.      Coordination: Coordination normal.      Gait: Gait normal.      Deep Tendon Reflexes: Reflexes are normal and symmetric. Reflexes normal.   Psychiatric:         Attention and Perception: Attention normal.         Mood and Affect: Mood is anxious.         Speech: Speech normal.         Behavior: Behavior normal. Behavior is cooperative.         Thought Content: Thought content normal. Thought content is not paranoid or delusional. Thought content does not

## 2024-12-23 ENCOUNTER — HOSPITAL ENCOUNTER (OUTPATIENT)
Age: 84
Discharge: HOME | End: 2024-12-23
Payer: MEDICARE

## 2024-12-23 DIAGNOSIS — E83.52 HIGH CALCIUM LEVELS: ICD-10-CM

## 2024-12-23 DIAGNOSIS — E78.2 MIXED HYPERLIPIDEMIA: ICD-10-CM

## 2024-12-23 LAB
CA-I BLD-SCNC: 1.25 MMOL/L (ref 1.13–1.33)
CHOLEST SERPL-MCNC: 149 MG/DL (ref 0–199)
CHOLESTEROL/HDL RATIO: 3.4
EST. AVERAGE GLUCOSE BLD GHB EST-MCNC: 174 MG/DL
HBA1C MFR BLD: 7.7 % (ref 4–6)
HDLC SERPL-MCNC: 44 MG/DL
LDLC SERPL CALC-MCNC: 86 MG/DL (ref 0–100)
PTH-INTACT SERPL-MCNC: 48 PG/ML (ref 15–65)
TRIGL SERPL-MCNC: 97 MG/DL (ref 0–149)

## 2024-12-23 PROCEDURE — 36415 COLL VENOUS BLD VENIPUNCTURE: CPT

## 2024-12-23 PROCEDURE — 83970 ASSAY OF PARATHORMONE: CPT

## 2024-12-23 PROCEDURE — 80061 LIPID PANEL: CPT

## 2024-12-23 PROCEDURE — 83036 HEMOGLOBIN GLYCOSYLATED A1C: CPT

## 2024-12-23 PROCEDURE — 82330 ASSAY OF CALCIUM: CPT

## 2024-12-23 NOTE — RESULT ENCOUNTER NOTE
Noted I already placed a note under PTH result, hemoglobin A1c worsening, normal PTH and calcium, lipids within normal limits  Future Appointments  3/4/2025   9:30 AM    Rayo Davis APRN - BENJI   [facility]               [facility] DE[facility]

## 2024-12-23 NOTE — RESULT ENCOUNTER NOTE
Please notify patient: PTH level and calcium normal, hemoglobin A1c 7.7 slightly worse diabetes but fairly controlled  Lipids within normal limits    continue current treatment    Future Appointments  3/4/2025   9:30 AM    Rayo Davis APRN - CNP   fp [facility] DE[facility]

## 2025-01-08 DIAGNOSIS — R33.9 INCOMPLETE BLADDER EMPTYING: ICD-10-CM

## 2025-01-09 RX ORDER — TAMSULOSIN HYDROCHLORIDE 0.4 MG/1
CAPSULE ORAL
Qty: 90 CAPSULE | Refills: 0 | Status: SHIPPED | OUTPATIENT
Start: 2025-01-09

## 2025-01-09 NOTE — TELEPHONE ENCOUNTER
Please Approve or Refuse.  Send to Pharmacy per Pt's Request:      Next Visit Date:  3/4/2025   Last Visit Date: 12/4/2024    Hemoglobin A1C (%)   Date Value   12/04/2024 7.6   08/09/2024 7.7 (H)   08/01/2024 7.3 (H)             ( goal A1C is < 7)   BP Readings from Last 3 Encounters:   12/04/24 118/76   09/04/24 136/74   08/01/24 (!) 145/70          (goal 120/80)  BUN   Date Value Ref Range Status   08/29/2024 19 8 - 23 mg/dL Final     Creatinine   Date Value Ref Range Status   08/29/2024 1.1 0.7 - 1.2 mg/dL Final     Potassium   Date Value Ref Range Status   08/29/2024 5.1 3.7 - 5.3 mmol/L Final

## 2025-01-24 DIAGNOSIS — E11.9 TYPE 2 DIABETES MELLITUS WITHOUT COMPLICATION, WITHOUT LONG-TERM CURRENT USE OF INSULIN (HCC): ICD-10-CM

## 2025-01-26 RX ORDER — CALCIUM CITRATE/VITAMIN D3 200MG-6.25
TABLET ORAL
Qty: 100 EACH | Refills: 5 | Status: SHIPPED | OUTPATIENT
Start: 2025-01-26

## 2025-01-28 ENCOUNTER — TELEPHONE (OUTPATIENT)
Dept: FAMILY MEDICINE CLINIC | Age: 85
End: 2025-01-28

## 2025-01-28 DIAGNOSIS — E11.9 TYPE 2 DIABETES MELLITUS WITHOUT COMPLICATION, WITHOUT LONG-TERM CURRENT USE OF INSULIN (HCC): ICD-10-CM

## 2025-01-28 RX ORDER — GLUCOSAM/CHON-MSM1/C/MANG/BOSW 500-416.6
TABLET ORAL
Qty: 300 EACH | Refills: 0 | Status: SHIPPED | OUTPATIENT
Start: 2025-01-28

## 2025-01-28 NOTE — TELEPHONE ENCOUNTER
[facility] called and said they can't fill the Lancets 300 and they needed a new prescription sent over because the last one says no refills.

## 2025-02-16 DIAGNOSIS — F41.9 ANXIETY: ICD-10-CM

## 2025-02-16 DIAGNOSIS — F33.0 MILD EPISODE OF RECURRENT MAJOR DEPRESSIVE DISORDER: ICD-10-CM

## 2025-02-17 NOTE — TELEPHONE ENCOUNTER
Please Approve or Refuse.  Send to Pharmacy per Pt's Request: [facility]      Next Visit Date:  3/4/2025   Last Visit Date: 12/4/2024    Hemoglobin A1C (%)   Date Value   12/04/2024 7.6   08/09/2024 7.7 (H)   08/01/2024 7.3 (H)             ( goal A1C is < 7)   BP Readings from Last 3 Encounters:   12/04/24 118/76   09/04/24 136/74   08/01/24 (!) 145/70          (goal 120/80)  BUN   Date Value Ref Range Status   08/29/2024 19 8 - 23 mg/dL Final     Creatinine   Date Value Ref Range Status   08/29/2024 1.1 0.7 - 1.2 mg/dL Final     Potassium   Date Value Ref Range Status   08/29/2024 5.1 3.7 - 5.3 mmol/L Final

## 2025-02-19 DIAGNOSIS — E11.9 TYPE 2 DIABETES MELLITUS WITHOUT COMPLICATION, WITHOUT LONG-TERM CURRENT USE OF INSULIN (HCC): ICD-10-CM

## 2025-02-21 RX ORDER — GLIMEPIRIDE 4 MG/1
4 TABLET ORAL
Qty: 90 TABLET | Refills: 0 | Status: SHIPPED | OUTPATIENT
Start: 2025-02-21

## 2025-03-01 SDOH — ECONOMIC STABILITY: FOOD INSECURITY: WITHIN THE PAST 12 MONTHS, YOU WORRIED THAT YOUR FOOD WOULD RUN OUT BEFORE YOU GOT MONEY TO BUY MORE.: NEVER TRUE

## 2025-03-01 SDOH — ECONOMIC STABILITY: INCOME INSECURITY: IN THE LAST 12 MONTHS, WAS THERE A TIME WHEN YOU WERE NOT ABLE TO PAY THE MORTGAGE OR RENT ON TIME?: NO

## 2025-03-01 SDOH — ECONOMIC STABILITY: TRANSPORTATION INSECURITY
IN THE PAST 12 MONTHS, HAS THE LACK OF TRANSPORTATION KEPT YOU FROM MEDICAL APPOINTMENTS OR FROM GETTING MEDICATIONS?: NO

## 2025-03-01 SDOH — ECONOMIC STABILITY: FOOD INSECURITY: WITHIN THE PAST 12 MONTHS, THE FOOD YOU BOUGHT JUST DIDN'T LAST AND YOU DIDN'T HAVE MONEY TO GET MORE.: NEVER TRUE

## 2025-03-01 ASSESSMENT — PATIENT HEALTH QUESTIONNAIRE - PHQ9
SUM OF ALL RESPONSES TO PHQ QUESTIONS 1-9: 0
3. TROUBLE FALLING OR STAYING ASLEEP: NOT AT ALL
6. FEELING BAD ABOUT YOURSELF - OR THAT YOU ARE A FAILURE OR HAVE LET YOURSELF OR YOUR FAMILY DOWN: NOT AT ALL
8. MOVING OR SPEAKING SO SLOWLY THAT OTHER PEOPLE COULD HAVE NOTICED. OR THE OPPOSITE - BEING SO FIDGETY OR RESTLESS THAT YOU HAVE BEEN MOVING AROUND A LOT MORE THAN USUAL: NOT AT ALL
4. FEELING TIRED OR HAVING LITTLE ENERGY: NOT AT ALL
2. FEELING DOWN, DEPRESSED OR HOPELESS: NOT AT ALL
5. POOR APPETITE OR OVEREATING: NOT AT ALL
7. TROUBLE CONCENTRATING ON THINGS, SUCH AS READING THE NEWSPAPER OR WATCHING TELEVISION: NOT AT ALL
SUM OF ALL RESPONSES TO PHQ QUESTIONS 1-9: 0
SUM OF ALL RESPONSES TO PHQ QUESTIONS 1-9: 0
1. LITTLE INTEREST OR PLEASURE IN DOING THINGS: NOT AT ALL
5. POOR APPETITE OR OVEREATING: NOT AT ALL
8. MOVING OR SPEAKING SO SLOWLY THAT OTHER PEOPLE COULD HAVE NOTICED. OR THE OPPOSITE, BEING SO FIGETY OR RESTLESS THAT YOU HAVE BEEN MOVING AROUND A LOT MORE THAN USUAL: NOT AT ALL
7. TROUBLE CONCENTRATING ON THINGS, SUCH AS READING THE NEWSPAPER OR WATCHING TELEVISION: NOT AT ALL
9. THOUGHTS THAT YOU WOULD BE BETTER OFF DEAD, OR OF HURTING YOURSELF: NOT AT ALL
10. IF YOU CHECKED OFF ANY PROBLEMS, HOW DIFFICULT HAVE THESE PROBLEMS MADE IT FOR YOU TO DO YOUR WORK, TAKE CARE OF THINGS AT HOME, OR GET ALONG WITH OTHER PEOPLE: NOT DIFFICULT AT ALL
2. FEELING DOWN, DEPRESSED OR HOPELESS: NOT AT ALL
1. LITTLE INTEREST OR PLEASURE IN DOING THINGS: NOT AT ALL
3. TROUBLE FALLING OR STAYING ASLEEP: NOT AT ALL
9. THOUGHTS THAT YOU WOULD BE BETTER OFF DEAD, OR OF HURTING YOURSELF: NOT AT ALL
10. IF YOU CHECKED OFF ANY PROBLEMS, HOW DIFFICULT HAVE THESE PROBLEMS MADE IT FOR YOU TO DO YOUR WORK, TAKE CARE OF THINGS AT HOME, OR GET ALONG WITH OTHER PEOPLE: NOT DIFFICULT AT ALL
6. FEELING BAD ABOUT YOURSELF - OR THAT YOU ARE A FAILURE OR HAVE LET YOURSELF OR YOUR FAMILY DOWN: NOT AT ALL
4. FEELING TIRED OR HAVING LITTLE ENERGY: NOT AT ALL

## 2025-03-04 ENCOUNTER — OFFICE VISIT (OUTPATIENT)
Dept: FAMILY MEDICINE CLINIC | Age: 85
End: 2025-03-04

## 2025-03-04 VITALS
DIASTOLIC BLOOD PRESSURE: 70 MMHG | SYSTOLIC BLOOD PRESSURE: 120 MMHG | BODY MASS INDEX: 27.35 KG/M2 | WEIGHT: 191 LBS | HEIGHT: 70 IN | OXYGEN SATURATION: 98 % | HEART RATE: 60 BPM

## 2025-03-04 DIAGNOSIS — F41.9 ANXIETY: ICD-10-CM

## 2025-03-04 DIAGNOSIS — E11.9 TYPE 2 DIABETES MELLITUS WITHOUT COMPLICATION, WITHOUT LONG-TERM CURRENT USE OF INSULIN (HCC): Primary | ICD-10-CM

## 2025-03-04 DIAGNOSIS — E83.52 SERUM CALCIUM ELEVATED: ICD-10-CM

## 2025-03-04 DIAGNOSIS — E78.2 MIXED HYPERLIPIDEMIA: ICD-10-CM

## 2025-03-04 DIAGNOSIS — R53.83 OTHER FATIGUE: ICD-10-CM

## 2025-03-04 DIAGNOSIS — I10 ESSENTIAL HYPERTENSION: ICD-10-CM

## 2025-03-04 DIAGNOSIS — E55.9 VITAMIN D DEFICIENCY: ICD-10-CM

## 2025-03-04 LAB — HBA1C MFR BLD: 8.2 %

## 2025-03-04 ASSESSMENT — ENCOUNTER SYMPTOMS
BACK PAIN: 0
DIARRHEA: 0
SHORTNESS OF BREATH: 0
CONSTIPATION: 0
NAUSEA: 0
ABDOMINAL PAIN: 0
VOMITING: 0
WHEEZING: 0
ABDOMINAL DISTENTION: 0
CHEST TIGHTNESS: 0
COUGH: 0

## 2025-03-04 NOTE — PROGRESS NOTES
Visit Information    Have you changed or started any medications since your last visit including any over-the-counter medicines, vitamins, or herbal medicines? no   Are you having any side effects from any of your medications? -  no  Have you stopped taking any of your medications? Is so, why? -  no    Have you seen any other physician or provider since your last visit? No  Have you had any other diagnostic tests since your last visit? No  Have you been seen in the emergency room and/or had an admission to a hospital since we last saw you? No  Have you had your routine dental cleaning in the past 6 months? yes     Have you activated your [employer] account? If not, what are your barriers? Yes     Patient Care Team:  Rayo Davis APRN - CNP as PCP - General (Nurse Practitioner)  Rayo Davis APRN - CNP as PCP - Empaneled Provider  Aubrey Crenshaw MD (Dermatology)    Medical History Review  Past Medical, Family, and Social History reviewed and does contribute to the patient presenting condition    Health Maintenance   Topic Date Due    Respiratory Syncytial Virus (RSV) Pregnant or age 60 yrs+ (1 - 1-dose 75+ series) Never done    Diabetic Alb to Cr ratio (uACR) test  08/01/2025    Lipids  08/09/2025    GFR test (Diabetes, CKD 3-4, OR last GFR 15-59)  08/29/2025    Annual Wellness Visit (Medicare)  09/05/2025    Depression Monitoring  03/01/2026    DTaP/Tdap/Td vaccine (2 - Td or Tdap) 04/18/2026    Flu vaccine  Completed    Shingles vaccine  Completed    Pneumococcal 50+ years Vaccine  Completed    COVID-19 Vaccine  Completed    Hepatitis A vaccine  Aged Out    Hepatitis B vaccine  Aged Out    Hib vaccine  Aged Out    Polio vaccine  Aged Out    Meningococcal (ACWY) vaccine  Aged Out    Hepatitis C screen  Discontinued     
constipation, diarrhea, nausea and vomiting.   Endocrine: Negative for cold intolerance, heat intolerance, polydipsia, polyphagia and polyuria.   Genitourinary:  Negative for decreased urine volume, difficulty urinating, frequency and urgency.   Musculoskeletal:  Negative for back pain.   Skin:  Negative for rash.   Neurological:  Negative for dizziness, weakness and headaches.   Psychiatric/Behavioral:  Negative for decreased concentration, dysphoric mood and sleep disturbance. The patient is nervous/anxious.        Current Outpatient Medications   Medication Sig Dispense Refill    glimepiride (AMARYL) 4 MG tablet TAKE 1 TABLET BY MOUTH IN THE MORNING BEFORE BREAKFAST 90 tablet 0    metFORMIN (GLUCOPHAGE) 1000 MG tablet TAKE 1 TABLET BY MOUTH TWO TIMES A DAY WITH MEALS **KEEP GLUCOSE BETWEEN 80 ** 180 tablet 0    sertraline (ZOLOFT) 50 MG tablet TAKE 1 TABLET BY MOUTH IN THE EVENING 90 tablet 0    TRUEplus Lancets 33G MISC Use to test fasting blood sugar daily 300 each 0    blood glucose test strips (TRUE METRIX BLOOD GLUCOSE TEST) strip USE ONE TEST STRIP TO TEST BLOOD SUGAR daily 100 each 5    tamsulosin (FLOMAX) 0.4 MG capsule TAKE 1 CAPSULE BY MOUTH EVERY DAY WITH SUPPER 90 capsule 0    vitamin D (CHOLECALCIFEROL) 50 MCG (2000 UT) TABS tablet Take 1 tablet by mouth daily 30 tablet 3    lisinopril (PRINIVIL;ZESTRIL) 40 MG tablet TAKE 1 TABLET BY MOUTH IN THE MORNING 90 tablet 3    atorvastatin (LIPITOR) 10 MG tablet TAKE 1 TABLET BY MOUTH EVERY DAY with supper 90 tablet 3    atenolol (TENORMIN) 50 MG tablet TAKE 1 TABLET BY MOUTH EVERY DAY WITH SUPPER. KEEP BLOOD PRESSURE BETWEEN 115/65 /85. KEEP HEART RATE BETWEEN 56 AND 90 90 tablet 3     No current facility-administered medications for this visit.        Objective   Blood pressure 120/70, pulse 60, height 1.778 m (5' 10\"), weight 86.6 kg (191 lb), SpO2 98%.    Physical Exam  Vitals and nursing note reviewed.   Constitutional:       General: He

## 2025-04-15 DIAGNOSIS — I10 ESSENTIAL HYPERTENSION: ICD-10-CM

## 2025-04-15 RX ORDER — ATENOLOL 50 MG/1
TABLET ORAL
Qty: 90 TABLET | Refills: 0 | Status: SHIPPED | OUTPATIENT
Start: 2025-04-15

## 2025-04-15 RX ORDER — LISINOPRIL 40 MG/1
40 TABLET ORAL EVERY MORNING
Qty: 90 TABLET | Refills: 0 | Status: SHIPPED | OUTPATIENT
Start: 2025-04-15

## 2025-04-15 RX ORDER — ATORVASTATIN CALCIUM 10 MG/1
TABLET, FILM COATED ORAL
Qty: 90 TABLET | Refills: 0 | Status: SHIPPED | OUTPATIENT
Start: 2025-04-15

## 2025-04-15 NOTE — TELEPHONE ENCOUNTER
Please Approve or Refuse.  Send to Pharmacy per Pt's Request:      Next Visit Date:  6/16/2025   Last Visit Date: 3/4/2025    Hemoglobin A1C (%)   Date Value   03/04/2025 8.2 (H)   12/04/2024 7.6   08/09/2024 7.7 (H)             ( goal A1C is < 7)   BP Readings from Last 3 Encounters:   03/04/25 120/70   12/04/24 118/76   09/04/24 136/74          (goal 120/80)  BUN   Date Value Ref Range Status   08/29/2024 19 8 - 23 mg/dL Final     Creatinine   Date Value Ref Range Status   08/29/2024 1.1 0.7 - 1.2 mg/dL Final     Potassium   Date Value Ref Range Status   08/29/2024 5.1 3.7 - 5.3 mmol/L Final

## 2025-04-22 ENCOUNTER — HOSPITAL ENCOUNTER (OUTPATIENT)
Dept: PREADMISSION TESTING | Age: 85
Discharge: HOME | End: 2025-04-26

## 2025-04-22 VITALS — HEIGHT: 70 IN | BODY MASS INDEX: 27.35 KG/M2 | WEIGHT: 191 LBS

## 2025-04-22 RX ORDER — KETOROLAC TROMETHAMINE 5 MG/ML
SOLUTION OPHTHALMIC
COMMUNITY
Start: 2025-04-14

## 2025-04-22 RX ORDER — PREDNISOLONE ACETATE 10 MG/ML
SUSPENSION/ DROPS OPHTHALMIC
COMMUNITY
Start: 2025-04-14

## 2025-04-22 RX ORDER — MOXIFLOXACIN 5 MG/ML
SOLUTION/ DROPS OPHTHALMIC
COMMUNITY
Start: 2025-04-14

## 2025-04-22 NOTE — PROGRESS NOTES
Pre-op Instructions For Out-Patient Surgery    Medication Instructions:  Please stop herbs and any supplements now (includes vitamins and minerals).    For these medications:  Dulaglutide (Trulicity), Exenatide (Byetta and Bydureon, Liraglutide (Victoza), Lixisenatide (Adlyxin), Semaglutide (Ozempic and Rybelsus), Tirzepatide (Mounjaro, Zepbound)- Stop 1 week prior if taking weekly or 1 day prior if taking every 12 hours or daily. N/A    Please contact your surgeon and prescribing physician for pre-op instructions for any blood thinners.    If you have inhalers/aerosol treatments at home, please use them the morning of your surgery and bring the inhalers with you to the hospital.    Please take the following medications the morning of your surgery with a sip of water:    Eye drops as instructed by Dr. Lin     Surgery Instructions:  After midnight before surgery:  Do not eat or drink anything, including water, mints, gum, and hard candy.  You may brush your teeth without swallowing.  No smoking, chewing tobacco, or street drugs.    Please shower or bathe before surgery.       Please do not wear any cologne, lotion, powder, deodorant, jewelry, piercings, perfume, makeup, nail polish, hair accessories, or hair spray on the day of surgery.  Wear loose comfortable clothing.    Leave your valuables at home but bring a payment source for any after-surgery prescriptions you plan to fill at [facility].  Bring a storage case for any glasses/contacts.    An adult who is responsible for you MUST drive you home and should be with you for the first 24 hours after surgery.     If having out-patient knee and foot surgeries, please arrange for planned crutches, walker, or wheelchair before arriving to the hospital.    The Day of Surgery:  Arrive at [facility] Surgery Entrance at the time directed by your surgeon and check in at the desk. 5/6/25 @ 12:30 pm    If you have a living

## 2025-04-29 NOTE — DISCHARGE INSTRUCTIONS
1.  Remove the shield at 1:00 p.m. today and begin all the eye drops.    2.  Repeat the eye drops at 5:00 p.m. and before bedtime one drop per bottle, any order.  Wait 3-4 minutes between drops.  The drops are:    ANTIBIOTIC:   [x]   Vigamox   []  Tobramycin   []  Zymaxid     []  Gatifloxacin     STEROID:     [x]   Prednisolone Acetate        []   Durezol    ANTI-INFLAMMATORY     []    Nevenac    [x]   Ketorolac    []  Ilevro    []  Prolensa    []         3. Place one drop from each bottle by looking up, pull the lower lid down gently and let the drop fall in.    4. You may wipe the eyelid gently with a clean tissue or cotton.    5. Place only the shield over the eye when sleeping for (4) four days:  Fix with tape    6. In the morning remove the shield and start putting in the drops, one drop from each bottle.  Replace the shield or wear glasses during the day.    7. Please call Dr Lin if you have any problems:    Office [phone]    8. Continue all your previous medications.  You may use Aspirin, Tylenol, or Advil if needed.    9.  You have an appointment in the office:    10.  Please bring your drops into the office at your next visit.

## 2025-05-05 ENCOUNTER — ANESTHESIA EVENT (OUTPATIENT)
Dept: OPERATING ROOM | Age: 85
End: 2025-05-05
Payer: MEDICARE

## 2025-05-05 DIAGNOSIS — E11.9 TYPE 2 DIABETES MELLITUS WITHOUT COMPLICATION, WITHOUT LONG-TERM CURRENT USE OF INSULIN (HCC): ICD-10-CM

## 2025-05-05 RX ORDER — CALCIUM CITRATE/VITAMIN D3 200MG-6.25
TABLET ORAL
Qty: 100 EACH | Refills: 5 | Status: SHIPPED | OUTPATIENT
Start: 2025-05-05 | End: 2025-05-07 | Stop reason: SDUPTHER

## 2025-05-05 RX ORDER — GLUCOSAM/CHON-MSM1/C/MANG/BOSW 500-416.6
TABLET ORAL
Qty: 300 EACH | Refills: 0 | Status: SHIPPED | OUTPATIENT
Start: 2025-05-05 | End: 2025-05-07 | Stop reason: SDUPTHER

## 2025-05-05 NOTE — PRE-PROCEDURE INSTRUCTIONS
No answer, left message ?        yes                     Unable to leave message ?    When were you told to arrive at hospital ?  1230    Do you have a  ?yes    Are you on any blood thinners ?                     If yes when did you stop taking ?    Do you have your prep Rx filled and instruction ?      Nothing to eat the day before , only clear liquids.    Are you experiencing any covid symptoms ?     Do you have any infections or rash we should be aware of ?      Do you have the Hibiclens soap to use the night before and the morning of surgery ?    Nothing to eat or drink after midnight, only a sip of water to take any medication instructed to take the night before.  Wear comfortable clothing, leave any valuables at home, remove any jewelry and body piercing .

## 2025-05-05 NOTE — TELEPHONE ENCOUNTER
[facility] called requesting a new script sent over for the lancets and the strips to say \"Once daily\" instead of Daily, they said the insurance wouldn't cover it if it doesn't say how many times a day.

## 2025-05-06 ENCOUNTER — ANESTHESIA (OUTPATIENT)
Dept: OPERATING ROOM | Age: 85
End: 2025-05-06
Payer: MEDICARE

## 2025-05-06 ENCOUNTER — HOSPITAL ENCOUNTER (OUTPATIENT)
Age: 85
Setting detail: OUTPATIENT SURGERY
Discharge: HOME | End: 2025-05-06
Attending: STUDENT IN AN ORGANIZED HEALTH CARE EDUCATION/TRAINING PROGRAM | Admitting: STUDENT IN AN ORGANIZED HEALTH CARE EDUCATION/TRAINING PROGRAM
Payer: MEDICARE

## 2025-05-06 VITALS
SYSTOLIC BLOOD PRESSURE: 160 MMHG | BODY MASS INDEX: 27.35 KG/M2 | TEMPERATURE: 97.3 F | DIASTOLIC BLOOD PRESSURE: 88 MMHG | WEIGHT: 191 LBS | HEIGHT: 70 IN | OXYGEN SATURATION: 98 % | HEART RATE: 57 BPM | RESPIRATION RATE: 16 BRPM

## 2025-05-06 LAB — GLUCOSE BLD-MCNC: 121 MG/DL (ref 75–110)

## 2025-05-06 PROCEDURE — 2500000003 HC RX 250 WO HCPCS: Performed by: STUDENT IN AN ORGANIZED HEALTH CARE EDUCATION/TRAINING PROGRAM

## 2025-05-06 PROCEDURE — 3600000002 HC SURGERY LEVEL 2 BASE: Performed by: STUDENT IN AN ORGANIZED HEALTH CARE EDUCATION/TRAINING PROGRAM

## 2025-05-06 PROCEDURE — 2580000003 HC RX 258: Performed by: STUDENT IN AN ORGANIZED HEALTH CARE EDUCATION/TRAINING PROGRAM

## 2025-05-06 PROCEDURE — 2709999900 HC NON-CHARGEABLE SUPPLY: Performed by: STUDENT IN AN ORGANIZED HEALTH CARE EDUCATION/TRAINING PROGRAM

## 2025-05-06 PROCEDURE — 3700000000 HC ANESTHESIA ATTENDED CARE: Performed by: STUDENT IN AN ORGANIZED HEALTH CARE EDUCATION/TRAINING PROGRAM

## 2025-05-06 PROCEDURE — 6370000000 HC RX 637 (ALT 250 FOR IP): Performed by: STUDENT IN AN ORGANIZED HEALTH CARE EDUCATION/TRAINING PROGRAM

## 2025-05-06 PROCEDURE — 6360000002 HC RX W HCPCS: Performed by: STUDENT IN AN ORGANIZED HEALTH CARE EDUCATION/TRAINING PROGRAM

## 2025-05-06 PROCEDURE — 82947 ASSAY GLUCOSE BLOOD QUANT: CPT

## 2025-05-06 PROCEDURE — 7100000011 HC PHASE II RECOVERY - ADDTL 15 MIN: Performed by: STUDENT IN AN ORGANIZED HEALTH CARE EDUCATION/TRAINING PROGRAM

## 2025-05-06 PROCEDURE — 6360000002 HC RX W HCPCS: Performed by: ANESTHESIOLOGY

## 2025-05-06 PROCEDURE — 3600000012 HC SURGERY LEVEL 2 ADDTL 15MIN: Performed by: STUDENT IN AN ORGANIZED HEALTH CARE EDUCATION/TRAINING PROGRAM

## 2025-05-06 PROCEDURE — 3700000001 HC ADD 15 MINUTES (ANESTHESIA): Performed by: STUDENT IN AN ORGANIZED HEALTH CARE EDUCATION/TRAINING PROGRAM

## 2025-05-06 PROCEDURE — 7100000010 HC PHASE II RECOVERY - FIRST 15 MIN: Performed by: STUDENT IN AN ORGANIZED HEALTH CARE EDUCATION/TRAINING PROGRAM

## 2025-05-06 PROCEDURE — V2632 POST CHMBR INTRAOCULAR LENS: HCPCS | Performed by: STUDENT IN AN ORGANIZED HEALTH CARE EDUCATION/TRAINING PROGRAM

## 2025-05-06 PROCEDURE — 6360000002 HC RX W HCPCS: Performed by: NURSE ANESTHETIST, CERTIFIED REGISTERED

## 2025-05-06 DEVICE — STERILE UV AND BLUE LIGHT FILTERING ACRYLIC FOLDABLE ASPHERIC POSTERIOR CHAMBER INTRAOCULAR LENS
Type: IMPLANTABLE DEVICE | Site: EYE | Status: FUNCTIONAL
Brand: CLAREON

## 2025-05-06 RX ORDER — NEOMYCIN SULFATE, POLYMYXIN B SULFATE, AND DEXAMETHASONE 3.5; 10000; 1 MG/G; [USP'U]/G; MG/G
OINTMENT OPHTHALMIC PRN
Status: DISCONTINUED | OUTPATIENT
Start: 2025-05-06 | End: 2025-05-06 | Stop reason: ALTCHOICE

## 2025-05-06 RX ORDER — BALANCED SALT SOLUTION ENRICHED WITH BICARBONATE, DEXTROSE, AND GLUTATHIONE
KIT INTRAOCULAR PRN
Status: DISCONTINUED | OUTPATIENT
Start: 2025-05-06 | End: 2025-05-06 | Stop reason: ALTCHOICE

## 2025-05-06 RX ORDER — TROPICAMIDE 10 MG/ML
1 SOLUTION/ DROPS OPHTHALMIC SEE ADMIN INSTRUCTIONS
Status: DISCONTINUED | OUTPATIENT
Start: 2025-05-06 | End: 2025-05-06 | Stop reason: HOSPADM

## 2025-05-06 RX ORDER — TETRACAINE HYDROCHLORIDE 5 MG/ML
1 SOLUTION OPHTHALMIC SEE ADMIN INSTRUCTIONS
Status: DISCONTINUED | OUTPATIENT
Start: 2025-05-06 | End: 2025-05-06 | Stop reason: HOSPADM

## 2025-05-06 RX ORDER — KETOROLAC TROMETHAMINE 5 MG/ML
1 SOLUTION OPHTHALMIC SEE ADMIN INSTRUCTIONS
Status: DISCONTINUED | OUTPATIENT
Start: 2025-05-06 | End: 2025-05-06 | Stop reason: HOSPADM

## 2025-05-06 RX ORDER — SODIUM CHLORIDE 0.9 % (FLUSH) 0.9 %
5-40 SYRINGE (ML) INJECTION PRN
Status: DISCONTINUED | OUTPATIENT
Start: 2025-05-06 | End: 2025-05-06 | Stop reason: HOSPADM

## 2025-05-06 RX ORDER — CIPROFLOXACIN HYDROCHLORIDE 3.5 MG/ML
1 SOLUTION/ DROPS TOPICAL SEE ADMIN INSTRUCTIONS
Status: DISCONTINUED | OUTPATIENT
Start: 2025-05-06 | End: 2025-05-06 | Stop reason: HOSPADM

## 2025-05-06 RX ORDER — CIPROFLOXACIN HYDROCHLORIDE 3.5 MG/ML
SOLUTION/ DROPS TOPICAL PRN
Status: DISCONTINUED | OUTPATIENT
Start: 2025-05-06 | End: 2025-05-06 | Stop reason: ALTCHOICE

## 2025-05-06 RX ORDER — FENTANYL CITRATE 50 UG/ML
INJECTION, SOLUTION INTRAMUSCULAR; INTRAVENOUS
Status: DISCONTINUED | OUTPATIENT
Start: 2025-05-06 | End: 2025-05-06 | Stop reason: SDUPTHER

## 2025-05-06 RX ORDER — PHENYLEPHRINE HYDROCHLORIDE 25 MG/ML
1 SOLUTION/ DROPS OPHTHALMIC SEE ADMIN INSTRUCTIONS
Status: DISCONTINUED | OUTPATIENT
Start: 2025-05-06 | End: 2025-05-06 | Stop reason: HOSPADM

## 2025-05-06 RX ORDER — LIDOCAINE HYDROCHLORIDE 10 MG/ML
1 INJECTION, SOLUTION EPIDURAL; INFILTRATION; INTRACAUDAL; PERINEURAL
Status: COMPLETED | OUTPATIENT
Start: 2025-05-06 | End: 2025-05-06

## 2025-05-06 RX ORDER — SODIUM CHLORIDE 0.9 % (FLUSH) 0.9 %
5-40 SYRINGE (ML) INJECTION EVERY 12 HOURS SCHEDULED
Status: DISCONTINUED | OUTPATIENT
Start: 2025-05-06 | End: 2025-05-06 | Stop reason: HOSPADM

## 2025-05-06 RX ORDER — SODIUM CHLORIDE 9 MG/ML
INJECTION, SOLUTION INTRAVENOUS PRN
Status: DISCONTINUED | OUTPATIENT
Start: 2025-05-06 | End: 2025-05-06 | Stop reason: HOSPADM

## 2025-05-06 RX ORDER — PREDNISOLONE ACETATE 10 MG/ML
SUSPENSION/ DROPS OPHTHALMIC PRN
Status: DISCONTINUED | OUTPATIENT
Start: 2025-05-06 | End: 2025-05-06 | Stop reason: ALTCHOICE

## 2025-05-06 RX ORDER — BUPIVACAINE HYDROCHLORIDE 5 MG/ML
0.2 INJECTION, SOLUTION EPIDURAL; INTRACAUDAL; PERINEURAL EVERY 5 MIN PRN
Status: COMPLETED | OUTPATIENT
Start: 2025-05-06 | End: 2025-05-06

## 2025-05-06 RX ORDER — TETRACAINE HYDROCHLORIDE 5 MG/ML
SOLUTION OPHTHALMIC PRN
Status: DISCONTINUED | OUTPATIENT
Start: 2025-05-06 | End: 2025-05-06 | Stop reason: ALTCHOICE

## 2025-05-06 RX ADMIN — KETOROLAC TROMETHAMINE 1 DROP: 0.5 SOLUTION OPHTHALMIC at 13:11

## 2025-05-06 RX ADMIN — TROPICAMIDE 1 DROP: 10 SOLUTION/ DROPS OPHTHALMIC at 13:04

## 2025-05-06 RX ADMIN — FENTANYL CITRATE 50 MCG: 50 INJECTION INTRAMUSCULAR; INTRAVENOUS at 14:59

## 2025-05-06 RX ADMIN — KETOROLAC TROMETHAMINE 1 DROP: 0.5 SOLUTION OPHTHALMIC at 13:04

## 2025-05-06 RX ADMIN — CIPROFLOXACIN HYDROCHLORIDE 1 DROP: 3 SOLUTION/ DROPS OPHTHALMIC at 12:58

## 2025-05-06 RX ADMIN — CIPROFLOXACIN HYDROCHLORIDE 1 DROP: 3 SOLUTION/ DROPS OPHTHALMIC at 13:11

## 2025-05-06 RX ADMIN — Medication 1 MG: at 13:04

## 2025-05-06 RX ADMIN — CIPROFLOXACIN HYDROCHLORIDE 1 DROP: 3 SOLUTION/ DROPS OPHTHALMIC at 13:03

## 2025-05-06 RX ADMIN — SODIUM CHLORIDE: 900 INJECTION, SOLUTION INTRAVENOUS at 14:58

## 2025-05-06 RX ADMIN — KETOROLAC TROMETHAMINE 1 DROP: 0.5 SOLUTION OPHTHALMIC at 12:58

## 2025-05-06 RX ADMIN — PHENYLEPHRINE HYDROCHLORIDE 1 DROP: 25 SOLUTION/ DROPS OPHTHALMIC at 13:11

## 2025-05-06 RX ADMIN — PHENYLEPHRINE HYDROCHLORIDE 1 DROP: 25 SOLUTION/ DROPS OPHTHALMIC at 12:58

## 2025-05-06 RX ADMIN — PHENYLEPHRINE HYDROCHLORIDE 1 DROP: 25 SOLUTION/ DROPS OPHTHALMIC at 13:04

## 2025-05-06 RX ADMIN — TETRACAINE HYDROCHLORIDE 1 DROP: 5 SOLUTION OPHTHALMIC at 12:52

## 2025-05-06 RX ADMIN — Medication 1 MG: at 12:58

## 2025-05-06 RX ADMIN — TROPICAMIDE 1 DROP: 10 SOLUTION/ DROPS OPHTHALMIC at 13:11

## 2025-05-06 RX ADMIN — TROPICAMIDE 1 DROP: 10 SOLUTION/ DROPS OPHTHALMIC at 12:58

## 2025-05-06 RX ADMIN — LIDOCAINE HYDROCHLORIDE 1 ML: 10 INJECTION, SOLUTION EPIDURAL; INFILTRATION; INTRACAUDAL; PERINEURAL at 13:16

## 2025-05-06 ASSESSMENT — ENCOUNTER SYMPTOMS: SHORTNESS OF BREATH: 0

## 2025-05-06 ASSESSMENT — PAIN - FUNCTIONAL ASSESSMENT
PAIN_FUNCTIONAL_ASSESSMENT: NONE - DENIES PAIN
PAIN_FUNCTIONAL_ASSESSMENT: 0-10

## 2025-05-06 NOTE — H&P
sodium chloride infusion, , IntraVENous, PRN, Aureliano George MD      No Known Allergies      PHYSICAL EXAMINATION    Vitals:    05/06/25 1316   BP: (!) 145/79   Pulse:    Resp:    Temp:    SpO2:        Gen: NAD  HEENT: Visually significant cataract   Pulm: CTA Bilaterally  Heart: RRR, no murmurs  Abd: soft, non-tender  Ext: no edema  Neuro: no focal deficits    Assessment:   1. Visually significant cataract   2. See preoperative ophthalmology notes    Plan:   1. Risks, benefits, alternatives to surgery discussed with the patient and family.  2. All questions were answered to their satisfaction.  3. Ok to proceed with surgery as planned.    Ad Lin MD

## 2025-05-06 NOTE — ANESTHESIA PRE PROCEDURE
Department of Anesthesiology  Preprocedure Note       Name:  Jon Fernandez   Age:  85 y.o.  :  1940                                          MRN:  [id number]         Date:  2025      Surgeon: Surgeon(s):  Ad Lin MD    Procedure: Procedure(s):  EYE CATARACT EMULSIFICATION INTRAOCULAR LENS IMPLANT    Medications prior to admission:   Prior to Admission medications    Medication Sig Start Date End Date Taking? Authorizing Provider   ketorolac (ACULAR) 0.5 % ophthalmic solution  25  Yes ProviderLorena MD   moxifloxacin (VIGAMOX) 0.5 % ophthalmic solution  25  Yes ProviderLorena MD   prednisoLONE acetate (PRED FORTE) 1 % ophthalmic suspension  25  Yes Provider, MD Lorena   atorvastatin (LIPITOR) 10 MG tablet TAKE 1 TABLET BY MOUTH EVERY DAY WITH SUPPER 4/15/25  Yes Rayo Davis APRN - CNP   atenolol (TENORMIN) 50 MG tablet TAKE 1 TABLET BY MOUTH EVERY DAY WITH SUPPER. KEEP BP BETWEEN 115/65 /85 AND HEART RATE BETWEEN 56 AND 90 4/15/25  Yes Rayo Davis APRN - CNP   lisinopril (PRINIVIL;ZESTRIL) 40 MG tablet TAKE 1 TABLET BY MOUTH IN THE MORNING 4/15/25  Yes Rayo Davis APRN - CNP   glimepiride (AMARYL) 4 MG tablet TAKE 1 TABLET BY MOUTH IN THE MORNING BEFORE BREAKFAST 25  Yes Rayo Davis APRN - CNP   metFORMIN (GLUCOPHAGE) 1000 MG tablet TAKE 1 TABLET BY MOUTH TWO TIMES A DAY WITH MEALS **KEEP GLUCOSE BETWEEN 80 ** 25  Yes Rayo Davis APRN - CNP   sertraline (ZOLOFT) 50 MG tablet TAKE 1 TABLET BY MOUTH IN THE EVENING 25  Yes Rayo Davis APRN - CNP   tamsulosin (FLOMAX) 0.4 MG capsule TAKE 1 CAPSULE BY MOUTH EVERY DAY WITH SUPPER 25  Yes Rayo Davis APRN - CNP   vitamin D (CHOLECALCIFEROL) 50 MCG (2000) TABS tablet Take 1 tablet by mouth daily 24  Yes Rayo Davis APRN - CNP   blood glucose test strips (TRUE METRIX BLOOD GLUCOSE TEST) strip USE ONE TEST STRIP TO TEST BLOOD SUGAR once daily, or as needed 25   Susan,

## 2025-05-06 NOTE — ANESTHESIA POSTPROCEDURE EVALUATION
Department of Anesthesiology  Postprocedure Note    Patient: Jon Fernandez  MRN: [id number]  YOB: 1940  Date of evaluation: 5/6/2025    Procedure Summary       Date: 05/06/25 Room / Location: [address]    Anesthesia Start: 1458 Anesthesia Stop: 1535    Procedure: EYE CATARACT EMULSIFICATION INTRAOCULAR LENS IMPLANT (Left: Eye) Diagnosis:       Nuclear sclerotic cataract of left eye      (Nuclear sclerotic cataract of left eye [H25.12])    Surgeons: Ad Lin MD Responsible Provider: Priya Regan MD    Anesthesia Type: MAC ASA Status: 2            Anesthesia Type: No value filed.    Sabra Phase I: Sabra Score: 10    Sabra Phase II:      Anesthesia Post Evaluation    Comments: POST- ANESTHESIA EVALUATION       Pt Name: Jon Fernandez  MRN: [id number]  YOB: 1940  Date of evaluation: 5/6/2025  Time:  4:36 PM      BP (!) 145/79   Pulse 58   Temp 96.8 °F (36 °C) (Infrared)   Resp 18   Ht 1.778 m (5' 10\")   Wt 86.6 kg (191 lb)   SpO2 97%   BMI 27.41 kg/m²      Consciousness Level  Awake  Cardiopulmonary Status  Stable  Pain Adequately Treated YES  Nausea / Vomiting  NO  Adequate Hydration  YES  Anesthesia Related Complications NONE      Electronically signed by Priya Regan MD on 5/6/2025 at 4:36 PM          No notable events documented.

## 2025-05-07 RX ORDER — CALCIUM CITRATE/VITAMIN D3 200MG-6.25
TABLET ORAL
Qty: 100 EACH | Refills: 5 | Status: SHIPPED | OUTPATIENT
Start: 2025-05-07

## 2025-05-07 RX ORDER — GLUCOSAM/CHON-MSM1/C/MANG/BOSW 500-416.6
TABLET ORAL
Qty: 300 EACH | Refills: 0 | Status: SHIPPED | OUTPATIENT
Start: 2025-05-07

## 2025-05-07 NOTE — OP NOTE
Jon Fernandez 85 years old, [id number]     OPERATIVE NOTE     Preoperative Diagnosis: Cataract left eye     Postoperative Diagnosis: Cataract left eye     Procedure: Phacoemulsification with intraocular lens implantation, SY60WF 20  D     Surgeon: COURTNEY FERNANDES MD     Anesthesia: MAC     Complications: none     Specimens: none     Blood loss: none     Indications for procedure:  The patient is a 85 y.o. year old with decreased vision, glare and halos around lights, and trouble with activities of daily living.  Examination revealed a visually significant cataract in the left eye.  Risks, benefits, and alternatives to surgery were discussed with the patient and the patient elected to proceed with phacoemulsification with lens implantation.      Details of the procedure:  Following informed consent, the patient was taken to the operating room and placed in the supine position.  The eye was prepped and draped in the usual sterile fashion using aseptic technique for cataract surgery and a lid speculum was placed between the lids.  Several drops of topical .5% Marcaine were place on the eye.  A keratome blade was used to make a 3-plane clear corneal incision in the temporally.  A side port incision was made in the inferior position.  A small amount of 1% non-preserved lidocaine was placed in the eye. The eye was filled with viscoelastic. A continuous tear capsulorhexis was performed.  The lens was hydrodissected and hydrodilineated with balanced salt solution.   Phacoemulsification was performed in a divide and conquer technique.  Irrigation/aspiration was used to remove all cortical material from the capsular bag.  The eye was filled with viscoelastic and a foldable posterior chamber intraocular lens was injected into the capsular bag and rotated into position model SY60WF 20 diopter power. Irrigation/aspiration was used to remove all excess viscoelastic.  The eye was pressurized and the wounds were checked for leaks and

## 2025-05-07 NOTE — TELEPHONE ENCOUNTER
Per pharmacy, they can not take \"as needed\" as part of the instructions for patients scripts. I updated them both. Can you please resign these

## 2025-05-16 DIAGNOSIS — F33.0 MILD EPISODE OF RECURRENT MAJOR DEPRESSIVE DISORDER: ICD-10-CM

## 2025-05-16 DIAGNOSIS — F41.9 ANXIETY: ICD-10-CM

## 2025-05-16 NOTE — TELEPHONE ENCOUNTER
Please Approve or Refuse.  Send to Pharmacy per Pt's Request:      Next Visit Date:  6/16/2025   Last Visit Date: 3/4/2025    Hemoglobin A1C (%)   Date Value   03/04/2025 8.2 (H)   12/04/2024 7.6   08/09/2024 7.7 (H)             ( goal A1C is < 7)   BP Readings from Last 3 Encounters:   05/06/25 (!) 160/88   03/04/25 120/70   12/04/24 118/76          (goal 120/80)  BUN   Date Value Ref Range Status   08/29/2024 19 8 - 23 mg/dL Final     Creatinine   Date Value Ref Range Status   08/29/2024 1.1 0.7 - 1.2 mg/dL Final     Potassium   Date Value Ref Range Status   08/29/2024 5.1 3.7 - 5.3 mmol/L Final

## 2025-05-19 RX ORDER — GLIMEPIRIDE 4 MG/1
4 TABLET ORAL
Qty: 90 TABLET | Refills: 0 | Status: SHIPPED | OUTPATIENT
Start: 2025-05-19

## 2025-05-20 DIAGNOSIS — E11.9 TYPE 2 DIABETES MELLITUS WITHOUT COMPLICATION, WITHOUT LONG-TERM CURRENT USE OF INSULIN (HCC): ICD-10-CM

## 2025-06-09 ENCOUNTER — HOSPITAL ENCOUNTER (OUTPATIENT)
Age: 85
Setting detail: SPECIMEN
Discharge: HOME | End: 2025-06-09
Payer: MEDICARE

## 2025-06-09 ENCOUNTER — RESULTS FOLLOW-UP (OUTPATIENT)
Dept: FAMILY MEDICINE CLINIC | Age: 85
End: 2025-06-09

## 2025-06-09 DIAGNOSIS — E87.5 HYPERKALEMIA: Primary | ICD-10-CM

## 2025-06-09 DIAGNOSIS — E83.52 SERUM CALCIUM ELEVATED: ICD-10-CM

## 2025-06-09 DIAGNOSIS — E11.9 TYPE 2 DIABETES MELLITUS WITHOUT COMPLICATION, WITHOUT LONG-TERM CURRENT USE OF INSULIN (HCC): ICD-10-CM

## 2025-06-09 DIAGNOSIS — E55.9 VITAMIN D DEFICIENCY: ICD-10-CM

## 2025-06-09 DIAGNOSIS — R53.83 OTHER FATIGUE: ICD-10-CM

## 2025-06-09 DIAGNOSIS — I10 ESSENTIAL HYPERTENSION: ICD-10-CM

## 2025-06-09 DIAGNOSIS — E78.2 MIXED HYPERLIPIDEMIA: ICD-10-CM

## 2025-06-09 LAB
25(OH)D3 SERPL-MCNC: 43.9 NG/ML (ref 30–100)
ALBUMIN SERPL-MCNC: 4.4 G/DL (ref 3.5–5.2)
ALP SERPL-CCNC: 71 U/L (ref 40–129)
ALT SERPL-CCNC: 24 U/L (ref 10–50)
ANION GAP SERPL CALCULATED.3IONS-SCNC: 11 MMOL/L (ref 9–16)
AST SERPL-CCNC: 19 U/L (ref 10–50)
BASOPHILS # BLD: 0.08 K/UL (ref 0–0.2)
BASOPHILS NFR BLD: 1 % (ref 0–2)
BILIRUB SERPL-MCNC: 0.4 MG/DL (ref 0–1.2)
BUN SERPL-MCNC: 26 MG/DL (ref 8–23)
CA-I BLD-SCNC: 1.28 MMOL/L (ref 1.13–1.33)
CALCIUM SERPL-MCNC: 10.3 MG/DL (ref 8.6–10.4)
CHLORIDE SERPL-SCNC: 107 MMOL/L (ref 98–107)
CHOLEST SERPL-MCNC: 145 MG/DL (ref 0–199)
CHOLESTEROL/HDL RATIO: 3.6
CO2 SERPL-SCNC: 23 MMOL/L (ref 20–31)
CREAT SERPL-MCNC: 1.2 MG/DL (ref 0.7–1.2)
EOSINOPHIL # BLD: 0.5 K/UL (ref 0–0.44)
EOSINOPHILS RELATIVE PERCENT: 6 % (ref 0–4)
ERYTHROCYTE [DISTWIDTH] IN BLOOD BY AUTOMATED COUNT: 13.6 % (ref 11.5–14.9)
FOLATE SERPL-MCNC: 8.8 NG/ML (ref 4.8–24.2)
GFR, ESTIMATED: 60 ML/MIN/1.73M2
GLUCOSE SERPL-MCNC: 150 MG/DL (ref 74–99)
HCT VFR BLD AUTO: 40.9 % (ref 41–53)
HDLC SERPL-MCNC: 40 MG/DL
HGB BLD-MCNC: 13.8 G/DL (ref 13.5–17.5)
IMM GRANULOCYTES # BLD AUTO: <0.03 K/UL (ref 0–0.3)
IMM GRANULOCYTES NFR BLD: 0 %
LDLC SERPL CALC-MCNC: 89 MG/DL (ref 0–100)
LYMPHOCYTES NFR BLD: 2.81 K/UL (ref 1.1–3.7)
LYMPHOCYTES RELATIVE PERCENT: 33 % (ref 24–44)
MAGNESIUM SERPL-MCNC: 1.6 MG/DL (ref 1.6–2.4)
MCH RBC QN AUTO: 29.6 PG (ref 26–34)
MCHC RBC AUTO-ENTMCNC: 33.7 G/DL (ref 31–37)
MCV RBC AUTO: 87.6 FL (ref 80–100)
MONOCYTES NFR BLD: 0.55 K/UL (ref 0.1–1.2)
MONOCYTES NFR BLD: 6 % (ref 3–12)
NEUTROPHILS NFR BLD: 54 % (ref 36–66)
NEUTS SEG NFR BLD: 4.69 K/UL (ref 1.5–8.1)
NRBC BLD-RTO: 0 PER 100 WBC
PHOSPHATE SERPL-MCNC: 3.4 MG/DL (ref 2.5–4.5)
PLATELET # BLD AUTO: 222 K/UL (ref 150–450)
PMV BLD AUTO: 10.4 FL (ref 8–13.5)
POTASSIUM SERPL-SCNC: 5.5 MMOL/L (ref 3.7–5.3)
PROT SERPL-MCNC: 7.1 G/DL (ref 6.6–8.7)
PTH-INTACT SERPL-MCNC: 42 PG/ML (ref 17.9–58.6)
RBC # BLD AUTO: 4.67 M/UL (ref 4.21–5.77)
SODIUM SERPL-SCNC: 141 MMOL/L (ref 136–145)
TRIGL SERPL-MCNC: 79 MG/DL (ref 0–149)
TSH SERPL DL<=0.05 MIU/L-ACNC: 2.11 UIU/ML (ref 0.27–4.2)
URATE SERPL-MCNC: 7 MG/DL (ref 3.4–7)
VIT B12 SERPL-MCNC: 307 PG/ML (ref 232–1245)
WBC OTHER # BLD: 8.7 K/UL (ref 3.5–11)

## 2025-06-09 PROCEDURE — 83970 ASSAY OF PARATHORMONE: CPT

## 2025-06-09 PROCEDURE — 82746 ASSAY OF FOLIC ACID SERUM: CPT

## 2025-06-09 PROCEDURE — 84443 ASSAY THYROID STIM HORMONE: CPT

## 2025-06-09 PROCEDURE — 82330 ASSAY OF CALCIUM: CPT

## 2025-06-09 PROCEDURE — 36415 COLL VENOUS BLD VENIPUNCTURE: CPT

## 2025-06-09 PROCEDURE — 82607 VITAMIN B-12: CPT

## 2025-06-09 PROCEDURE — 80053 COMPREHEN METABOLIC PANEL: CPT

## 2025-06-09 PROCEDURE — 85025 COMPLETE CBC W/AUTO DIFF WBC: CPT

## 2025-06-09 PROCEDURE — 83735 ASSAY OF MAGNESIUM: CPT

## 2025-06-09 PROCEDURE — 84550 ASSAY OF BLOOD/URIC ACID: CPT

## 2025-06-09 PROCEDURE — 80061 LIPID PANEL: CPT

## 2025-06-09 PROCEDURE — 82306 VITAMIN D 25 HYDROXY: CPT

## 2025-06-09 PROCEDURE — 84100 ASSAY OF PHOSPHORUS: CPT

## 2025-06-10 NOTE — DISCHARGE INSTRUCTIONS
1.  Remove the shield at 1:00 p.m. today and begin all the eye drops.    2.  Repeat the eye drops at 5:00 p.m. and before bedtime one drop per bottle, any order.  Wait 3-4 minutes between drops.  The drops are:    ANTIBIOTIC:   [x]   Vigamox   []  Tobramycin   []  Zymaxid     []  Gatifloxacin     STEROID:     [x]   Prednisolone Acetate        []   Durezol    ANTI-INFLAMMATORY     []    Nevenac    [x]   Ketorolac    []  Ilevro    []  Prolensa    []         3. Place one drop from each bottle by looking up, pull the lower lid down gently and let the drop fall in.    4. You may wipe the eyelid gently with a clean tissue or cotton.    5. Place only the shield over the eye when sleeping for (4) four days:  Fix with tape    6. In the morning remove the shield and start putting in the drops, one drop from each bottle.  Replace the shield or wear glasses during the day.    7. Please call Dr Lin if you have any problems:    Office [phone]    8. Continue all your previous medications.  You may use Aspirin, Tylenol, or Advil if needed.    9.  You have an appointment in the office:    10.  Please bring your drops into the office at your next visit.       DISCHARGE INSTRUCTIONS FOR  ANESTHESIA    In order to continue your care at home, please follow the instructions below.    Anesthesia - Do not drink any alcoholic beverages or make any legal or important decisions for 24 hours. If your surgery was on an extremity or abdomen, do not drive or operate any machinery until your surgeon approves, otherwise do not drive or operate any machinery for 24 hours or as restricted by your surgeon.     e.

## 2025-06-11 ENCOUNTER — ANESTHESIA EVENT (OUTPATIENT)
Dept: OPERATING ROOM | Age: 85
End: 2025-06-11
Payer: MEDICARE

## 2025-06-11 NOTE — PRE-PROCEDURE INSTRUCTIONS
No answer, left message ?        N/A                     Unable to leave message ?N/A    When were you told to arrive at hospital ?  0730    Do you have a  ?YES WIFE    Are you on any blood thinners ?  NO                   If yes when did you stop taking ?N/A    Do you have your prep Rx filled and instruction ?  N/A    Nothing to eat the day before , only clear liquids.N/A    Are you experiencing any covid symptoms ? NO    Do you have any infections or rash we should be aware of ?NO      Do you have the Hibiclens soap to use the night before and the morning of surgery ?N/A    Nothing to eat or drink after midnight, only a sip of water to take any medication instructed to take the night before.INSTRUCTED  Wear comfortable clothing, leave any valuables at home, remove any jewelry and body piercing . INSTRUCTED

## 2025-06-12 ENCOUNTER — TELEPHONE (OUTPATIENT)
Age: 85
End: 2025-06-12

## 2025-06-12 ENCOUNTER — HOSPITAL ENCOUNTER (OUTPATIENT)
Age: 85
Setting detail: OUTPATIENT SURGERY
Discharge: HOME | End: 2025-06-12
Attending: STUDENT IN AN ORGANIZED HEALTH CARE EDUCATION/TRAINING PROGRAM | Admitting: STUDENT IN AN ORGANIZED HEALTH CARE EDUCATION/TRAINING PROGRAM
Payer: MEDICARE

## 2025-06-12 ENCOUNTER — ANESTHESIA (OUTPATIENT)
Dept: OPERATING ROOM | Age: 85
End: 2025-06-12
Payer: MEDICARE

## 2025-06-12 VITALS
TEMPERATURE: 97.2 F | HEART RATE: 54 BPM | WEIGHT: 191 LBS | DIASTOLIC BLOOD PRESSURE: 81 MMHG | SYSTOLIC BLOOD PRESSURE: 162 MMHG | RESPIRATION RATE: 18 BRPM | OXYGEN SATURATION: 99 % | BODY MASS INDEX: 27.35 KG/M2 | HEIGHT: 70 IN

## 2025-06-12 LAB — GLUCOSE BLD-MCNC: 113 MG/DL (ref 75–110)

## 2025-06-12 PROCEDURE — 6360000002 HC RX W HCPCS: Performed by: ANESTHESIOLOGY

## 2025-06-12 PROCEDURE — 3600000012 HC SURGERY LEVEL 2 ADDTL 15MIN: Performed by: STUDENT IN AN ORGANIZED HEALTH CARE EDUCATION/TRAINING PROGRAM

## 2025-06-12 PROCEDURE — 6360000002 HC RX W HCPCS: Performed by: STUDENT IN AN ORGANIZED HEALTH CARE EDUCATION/TRAINING PROGRAM

## 2025-06-12 PROCEDURE — 7100000011 HC PHASE II RECOVERY - ADDTL 15 MIN: Performed by: STUDENT IN AN ORGANIZED HEALTH CARE EDUCATION/TRAINING PROGRAM

## 2025-06-12 PROCEDURE — 2500000003 HC RX 250 WO HCPCS: Performed by: ANESTHESIOLOGY

## 2025-06-12 PROCEDURE — 82947 ASSAY GLUCOSE BLOOD QUANT: CPT

## 2025-06-12 PROCEDURE — 2709999900 HC NON-CHARGEABLE SUPPLY: Performed by: STUDENT IN AN ORGANIZED HEALTH CARE EDUCATION/TRAINING PROGRAM

## 2025-06-12 PROCEDURE — V2632 POST CHMBR INTRAOCULAR LENS: HCPCS | Performed by: STUDENT IN AN ORGANIZED HEALTH CARE EDUCATION/TRAINING PROGRAM

## 2025-06-12 PROCEDURE — 3700000001 HC ADD 15 MINUTES (ANESTHESIA): Performed by: STUDENT IN AN ORGANIZED HEALTH CARE EDUCATION/TRAINING PROGRAM

## 2025-06-12 PROCEDURE — 6370000000 HC RX 637 (ALT 250 FOR IP): Performed by: STUDENT IN AN ORGANIZED HEALTH CARE EDUCATION/TRAINING PROGRAM

## 2025-06-12 PROCEDURE — 3600000002 HC SURGERY LEVEL 2 BASE: Performed by: STUDENT IN AN ORGANIZED HEALTH CARE EDUCATION/TRAINING PROGRAM

## 2025-06-12 PROCEDURE — 7100000010 HC PHASE II RECOVERY - FIRST 15 MIN: Performed by: STUDENT IN AN ORGANIZED HEALTH CARE EDUCATION/TRAINING PROGRAM

## 2025-06-12 PROCEDURE — 3700000000 HC ANESTHESIA ATTENDED CARE: Performed by: STUDENT IN AN ORGANIZED HEALTH CARE EDUCATION/TRAINING PROGRAM

## 2025-06-12 PROCEDURE — 2500000003 HC RX 250 WO HCPCS: Performed by: STUDENT IN AN ORGANIZED HEALTH CARE EDUCATION/TRAINING PROGRAM

## 2025-06-12 DEVICE — STERILE UV AND BLUE LIGHT FILTERING ACRYLIC FOLDABLE ASPHERIC POSTERIOR CHAMBER INTRAOCULAR LENS
Type: IMPLANTABLE DEVICE | Site: EYE | Status: FUNCTIONAL
Brand: CLAREON

## 2025-06-12 RX ORDER — CIPROFLOXACIN HYDROCHLORIDE 3.5 MG/ML
SOLUTION/ DROPS TOPICAL PRN
Status: DISCONTINUED | OUTPATIENT
Start: 2025-06-12 | End: 2025-06-12 | Stop reason: ALTCHOICE

## 2025-06-12 RX ORDER — SODIUM CHLORIDE 0.9 % (FLUSH) 0.9 %
5-40 SYRINGE (ML) INJECTION EVERY 12 HOURS SCHEDULED
Status: DISCONTINUED | OUTPATIENT
Start: 2025-06-12 | End: 2025-06-12 | Stop reason: HOSPADM

## 2025-06-12 RX ORDER — KETOROLAC TROMETHAMINE 5 MG/ML
1 SOLUTION OPHTHALMIC SEE ADMIN INSTRUCTIONS
Status: DISCONTINUED | OUTPATIENT
Start: 2025-06-12 | End: 2025-06-12 | Stop reason: HOSPADM

## 2025-06-12 RX ORDER — NEOMYCIN SULFATE, POLYMYXIN B SULFATE, AND DEXAMETHASONE 3.5; 10000; 1 MG/G; [USP'U]/G; MG/G
OINTMENT OPHTHALMIC PRN
Status: DISCONTINUED | OUTPATIENT
Start: 2025-06-12 | End: 2025-06-12 | Stop reason: ALTCHOICE

## 2025-06-12 RX ORDER — BALANCED SALT SOLUTION ENRICHED WITH BICARBONATE, DEXTROSE, AND GLUTATHIONE
KIT INTRAOCULAR PRN
Status: DISCONTINUED | OUTPATIENT
Start: 2025-06-12 | End: 2025-06-12 | Stop reason: ALTCHOICE

## 2025-06-12 RX ORDER — BUPIVACAINE HYDROCHLORIDE 5 MG/ML
0.2 INJECTION, SOLUTION EPIDURAL; INTRACAUDAL; PERINEURAL EVERY 5 MIN PRN
Status: COMPLETED | OUTPATIENT
Start: 2025-06-12 | End: 2025-06-12

## 2025-06-12 RX ORDER — SODIUM CHLORIDE 9 MG/ML
INJECTION, SOLUTION INTRAVENOUS PRN
Status: DISCONTINUED | OUTPATIENT
Start: 2025-06-12 | End: 2025-06-12 | Stop reason: HOSPADM

## 2025-06-12 RX ORDER — TROPICAMIDE 10 MG/ML
1 SOLUTION/ DROPS OPHTHALMIC SEE ADMIN INSTRUCTIONS
Status: DISCONTINUED | OUTPATIENT
Start: 2025-06-12 | End: 2025-06-12 | Stop reason: HOSPADM

## 2025-06-12 RX ORDER — FENTANYL CITRATE 50 UG/ML
INJECTION, SOLUTION INTRAMUSCULAR; INTRAVENOUS
Status: DISCONTINUED | OUTPATIENT
Start: 2025-06-12 | End: 2025-06-12 | Stop reason: SDUPTHER

## 2025-06-12 RX ORDER — CIPROFLOXACIN HYDROCHLORIDE 3.5 MG/ML
1 SOLUTION/ DROPS TOPICAL SEE ADMIN INSTRUCTIONS
Status: DISCONTINUED | OUTPATIENT
Start: 2025-06-12 | End: 2025-06-12 | Stop reason: HOSPADM

## 2025-06-12 RX ORDER — TETRACAINE HYDROCHLORIDE 5 MG/ML
1 SOLUTION OPHTHALMIC SEE ADMIN INSTRUCTIONS
Status: DISCONTINUED | OUTPATIENT
Start: 2025-06-12 | End: 2025-06-12 | Stop reason: HOSPADM

## 2025-06-12 RX ORDER — PREDNISOLONE ACETATE 10 MG/ML
SUSPENSION/ DROPS OPHTHALMIC PRN
Status: DISCONTINUED | OUTPATIENT
Start: 2025-06-12 | End: 2025-06-12 | Stop reason: ALTCHOICE

## 2025-06-12 RX ORDER — SODIUM CHLORIDE 0.9 % (FLUSH) 0.9 %
5-40 SYRINGE (ML) INJECTION PRN
Status: DISCONTINUED | OUTPATIENT
Start: 2025-06-12 | End: 2025-06-12 | Stop reason: HOSPADM

## 2025-06-12 RX ORDER — LIDOCAINE HYDROCHLORIDE 10 MG/ML
1 INJECTION, SOLUTION EPIDURAL; INFILTRATION; INTRACAUDAL; PERINEURAL
Status: COMPLETED | OUTPATIENT
Start: 2025-06-12 | End: 2025-06-12

## 2025-06-12 RX ORDER — PHENYLEPHRINE HYDROCHLORIDE 25 MG/ML
1 SOLUTION/ DROPS OPHTHALMIC SEE ADMIN INSTRUCTIONS
Status: DISCONTINUED | OUTPATIENT
Start: 2025-06-12 | End: 2025-06-12 | Stop reason: HOSPADM

## 2025-06-12 RX ORDER — MIDAZOLAM HYDROCHLORIDE 2 MG/2ML
INJECTION, SOLUTION INTRAMUSCULAR; INTRAVENOUS
Status: DISCONTINUED | OUTPATIENT
Start: 2025-06-12 | End: 2025-06-12 | Stop reason: SDUPTHER

## 2025-06-12 RX ADMIN — CIPROFLOXACIN HYDROCHLORIDE 1 DROP: 3 SOLUTION/ DROPS OPHTHALMIC at 08:15

## 2025-06-12 RX ADMIN — LIDOCAINE HYDROCHLORIDE 1 ML: 10 INJECTION, SOLUTION EPIDURAL; INFILTRATION; INTRACAUDAL; PERINEURAL at 08:34

## 2025-06-12 RX ADMIN — Medication 1 MG: at 08:15

## 2025-06-12 RX ADMIN — TROPICAMIDE 1 DROP: 10 SOLUTION/ DROPS OPHTHALMIC at 08:32

## 2025-06-12 RX ADMIN — MIDAZOLAM HYDROCHLORIDE 1 MG: 1 INJECTION, SOLUTION INTRAMUSCULAR; INTRAVENOUS at 09:42

## 2025-06-12 RX ADMIN — TROPICAMIDE 1 DROP: 10 SOLUTION/ DROPS OPHTHALMIC at 08:03

## 2025-06-12 RX ADMIN — PHENYLEPHRINE HYDROCHLORIDE 1 DROP: 25 SOLUTION/ DROPS OPHTHALMIC at 08:03

## 2025-06-12 RX ADMIN — KETOROLAC TROMETHAMINE 1 DROP: 0.5 SOLUTION OPHTHALMIC at 08:15

## 2025-06-12 RX ADMIN — PHENYLEPHRINE HYDROCHLORIDE 1 DROP: 25 SOLUTION/ DROPS OPHTHALMIC at 08:15

## 2025-06-12 RX ADMIN — Medication 5 ML: at 09:54

## 2025-06-12 RX ADMIN — FENTANYL CITRATE 25 MCG: 50 INJECTION INTRAMUSCULAR; INTRAVENOUS at 09:42

## 2025-06-12 RX ADMIN — CIPROFLOXACIN HYDROCHLORIDE 1 DROP: 3 SOLUTION/ DROPS OPHTHALMIC at 08:32

## 2025-06-12 RX ADMIN — TETRACAINE HYDROCHLORIDE 1 DROP: 5 SOLUTION OPHTHALMIC at 07:59

## 2025-06-12 RX ADMIN — MIDAZOLAM HYDROCHLORIDE 1 MG: 1 INJECTION, SOLUTION INTRAMUSCULAR; INTRAVENOUS at 09:48

## 2025-06-12 RX ADMIN — KETOROLAC TROMETHAMINE 1 DROP: 0.5 SOLUTION OPHTHALMIC at 08:03

## 2025-06-12 RX ADMIN — TROPICAMIDE 1 DROP: 10 SOLUTION/ DROPS OPHTHALMIC at 08:15

## 2025-06-12 RX ADMIN — CIPROFLOXACIN HYDROCHLORIDE 1 DROP: 3 SOLUTION/ DROPS OPHTHALMIC at 08:03

## 2025-06-12 RX ADMIN — KETOROLAC TROMETHAMINE 1 DROP: 0.5 SOLUTION OPHTHALMIC at 08:32

## 2025-06-12 RX ADMIN — Medication 5 ML: at 09:46

## 2025-06-12 RX ADMIN — PHENYLEPHRINE HYDROCHLORIDE 1 DROP: 25 SOLUTION/ DROPS OPHTHALMIC at 08:32

## 2025-06-12 RX ADMIN — Medication 1 MG: at 08:03

## 2025-06-12 RX ADMIN — FENTANYL CITRATE 25 MCG: 50 INJECTION INTRAMUSCULAR; INTRAVENOUS at 09:48

## 2025-06-12 ASSESSMENT — PAIN - FUNCTIONAL ASSESSMENT
PAIN_FUNCTIONAL_ASSESSMENT: 0-10
PAIN_FUNCTIONAL_ASSESSMENT: NONE - DENIES PAIN

## 2025-06-12 ASSESSMENT — ENCOUNTER SYMPTOMS: SHORTNESS OF BREATH: 0

## 2025-06-12 NOTE — OP NOTE
Jon Fernandez 85 years old, [id number]     OPERATIVE NOTE     Preoperative Diagnosis: Cataract right eye     Postoperative Diagnosis: Cataract right eye     Procedure: Phacoemulsification with intraocular lens implantation, SY60WF 20.5  D    Surgeon: COURTNEY FERNANDES MD     Anesthesia: MAC     Complications: none     Specimens: none     Blood loss: none     Indications for procedure:  The patient is a 85 y.o. year old with decreased vision, glare and halos around lights, and trouble with activities of daily living.  Examination revealed a visually significant cataract in the right eye.  Risks, benefits, and alternatives to surgery were discussed with the patient and the patient elected to proceed with phacoemulsification with lens implantation.      Details of the procedure:  Following informed consent, the patient was taken to the operating room and placed in the supine position.  The eye was prepped and draped in the usual sterile fashion using aseptic technique for cataract surgery and a lid speculum was placed between the lids.  Several drops of topical .5% Marcaine were place on the eye.  A keratome blade was used to make a 3-plane clear corneal incision in the temporally.  A side port incision was made in the superior position.  A small amount of 1% non-preserved lidocaine was placed in the eye. The eye was filled with viscoelastic. A continuous tear capsulorhexis was performed.  The lens was hydrodissected and hydrodilineated with balanced salt solution.   Phacoemulsification was performed in a divide and conquer technique.  Irrigation/aspiration was used to remove all cortical material from the capsular bag.  The eye was filled with viscoelastic and a foldable posterior chamber intraocular lens was injected into the capsular bag and rotated into position model SY60WF 20.5 diopter power. Irrigation/aspiration was used to remove all excess viscoelastic.  The eye was pressurized and the wounds were checked for

## 2025-06-12 NOTE — ANESTHESIA POSTPROCEDURE EVALUATION
Department of Anesthesiology  Postprocedure Note    Patient: Jon Fernandez  MRN: [id number]  YOB: 1940  Date of evaluation: 6/12/2025    Procedure Summary       Date: 06/12/25 Room / Location: [address]    Anesthesia Start: 0939 Anesthesia Stop: 1014    Procedure: EYE CATARACT EMULSIFICATION INTRAOCULAR LENS IMPLANT (Right: Eye) Diagnosis:       Nuclear sclerotic cataract of right eye      (Nuclear sclerotic cataract of right eye [H25.11])    Surgeons: Ad Lin MD Responsible Provider: Christiano Elliott MD    Anesthesia Type: MAC ASA Status: 2            Anesthesia Type: No value filed.    Sabra Phase I: Sabra Score: 10    Sabra Phase II: Sabra Score: 10    Anesthesia Post Evaluation    Comments: POST- ANESTHESIA EVALUATION       Pt Name: Jon Fernandez  MRN: [id number]  YOB: 1940  Date of evaluation: 6/12/2025  Time:  10:53 AM      BP (!) 162/81   Pulse 54   Temp 97.2 °F (36.2 °C) (Infrared)   Resp 18   Ht 1.778 m (5' 10\")   Wt 86.6 kg (191 lb)   SpO2 99%   BMI 27.41 kg/m²      Consciousness Level  Awake  Cardiopulmonary Status  Stable  Pain Adequately Treated YES  Nausea / Vomiting  NO  Adequate Hydration  YES  Anesthesia Related Complications NONE      Electronically signed by Christiano Elliott MD on 6/12/2025 at 10:53 AM           No notable events documented.

## 2025-06-12 NOTE — ANESTHESIA PRE PROCEDURE
Department of Anesthesiology  Preprocedure Note       Name:  Jon Fernandez   Age:  85 y.o.  :  1940                                          MRN:  [id number]         Date:  2025      Surgeon: Surgeon(s):  Ad Lin MD    Procedure: Procedure(s):  EYE CATARACT EMULSIFICATION INTRAOCULAR LENS IMPLANT    Medications prior to admission:   Prior to Admission medications    Medication Sig Start Date End Date Taking? Authorizing Provider   metFORMIN (GLUCOPHAGE) 1000 MG tablet TAKE 1 TABLET BY MOUTH TWO TIMES A DAY WITH MEALS **KEEP GLUCOSE BETWEEN 80 ** 25   Rayo Davis APRN - CNP   glimepiride (AMARYL) 4 MG tablet TAKE 1 TABLET BY MOUTH IN THE MORNING BEFORE BREAKFAST 25   Rayo Davis APRN - CNP   sertraline (ZOLOFT) 50 MG tablet TAKE 1 TABLET BY MOUTH IN THE EVENING 25   Rayo Davis APRN - CNP   blood glucose test strips (TRUE METRIX BLOOD GLUCOSE TEST) strip USE ONE TEST STRIP TO TEST BLOOD SUGAR once daily 25   Rayo Davis APRN - CNP   TRUEplus Lancets 33G MISC Use to test fasting blood sugar, once daily 25   Rayo Davis APRN - CNP   ketorolac (ACULAR) 0.5 % ophthalmic solution  25   Lorena Devine MD   moxifloxacin (VIGAMOX) 0.5 % ophthalmic solution  25   Lorena Devine MD   prednisoLONE acetate (PRED FORTE) 1 % ophthalmic suspension  25   Lorena Devine MD   atorvastatin (LIPITOR) 10 MG tablet TAKE 1 TABLET BY MOUTH EVERY DAY WITH SUPPER 4/15/25   Rayo Davis APRN - CNP   atenolol (TENORMIN) 50 MG tablet TAKE 1 TABLET BY MOUTH EVERY DAY WITH SUPPER. KEEP BP BETWEEN 115/65 /85 AND HEART RATE BETWEEN 56 AND 90 4/15/25   Rayo Davis APRN - CNP   lisinopril (PRINIVIL;ZESTRIL) 40 MG tablet TAKE 1 TABLET BY MOUTH IN THE MORNING 4/15/25   Rayo Davis APRN - CNP   tamsulosin (FLOMAX) 0.4 MG capsule TAKE 1 CAPSULE BY MOUTH EVERY DAY WITH SUPPER 25   Susan, Rayo, APRN - CNP   vitamin D (CHOLECALCIFEROL) 50 MCG ( UT)

## 2025-06-12 NOTE — H&P
Jon Fernandez is a 85 y.o. year old who presents for elective outpatient ophthalmic surgery with Ad Lin MD.  The patient complains of decreased vision, glare and halos around lights, and trouble with vision for activities of daily living.      Past Medical History:   Diagnosis Date    Anxiety     Depression 8/18/2016    Hyperlipidemia     Hypertension     Mixed hyperlipidemia 5/17/2016    Type 2 diabetes mellitus (HCC)     Type 2 diabetes mellitus without complication, without long-term current use of insulin (HCC) 5/17/2016       Past Surgical History:   Procedure Laterality Date    EYE SURGERY Left 5/6/2025    EYE CATARACT EMULSIFICATION INTRAOCULAR LENS IMPLANT performed by Ad Lin MD at [facility] OR    SKIN CANCER EXCISION      Squamous Cell Carcinoma    WISDOM TOOTH EXTRACTION      all 4 extracted         Current Facility-Administered Medications:     sodium chloride flush 0.9 % injection 5-40 mL, 5-40 mL, IntraVENous, 2 times per day, Ad Lin MD    sodium chloride flush 0.9 % injection 5-40 mL, 5-40 mL, IntraVENous, PRN, Ad Lin MD    0.9 % sodium chloride infusion, , IntraVENous, PRN, Ad Lin MD    tetracaine (TETRAVISC) 0.5 % ophthalmic solution 1 drop, 1 drop, Right Eye, See Admin Instructions, dA Lin MD, 1 drop at 06/12/25 0759    tropicamide (MYDRIACYL) 1 % ophthalmic solution 1 drop, 1 drop, Right Eye, See Admin Instructions, Ad Lin MD, 1 drop at 06/12/25 0832    phenylephrine (MYDFRIN) 2.5 % ophthalmic solution 1 drop, 1 drop, Right Eye, See Admin Instructions, Ad Lin MD, 1 drop at 06/12/25 0832    ciprofloxacin (CILOXAN) 0.3 % ophthalmic solution 1 drop, 1 drop, Right Eye, See Admin Instructions, Ad Lin MD, 1 drop at 06/12/25 0832    ketorolac (ACULAR) 0.5 % ophthalmic solution 1 drop, 1 drop, Right Eye, See Admin Instructions, dA Lin MD, 1 drop at 06/12/25 0832    sodium chloride flush 0.9 % injection 5-40 mL, 5-40 mL, IntraVENous, 2 times per

## 2025-06-12 NOTE — TELEPHONE ENCOUNTER
Patient had scheduled cataract surgery 6/12/25; has not been seen for diabetes follow up since 08/01/2024 and as of 9/5/24 was no longer interested in medication management services and discharged from our service    Julieta Conley PharmD  6/12/2025, 1:53 PM     IV discontinued, cath removed intact

## 2025-06-12 NOTE — BRIEF OP NOTE
Brief Postoperative Note      Patient: Jon Fernandez  YOB: 1940  MRN: [id number]    Date of Procedure: 6/12/2025    Pre-Op Diagnosis Codes:      * Nuclear sclerotic cataract of right eye [H25.11]    Post-Op Diagnosis: Same       Procedure(s):  EYE CATARACT EMULSIFICATION INTRAOCULAR LENS IMPLANT    Surgeon(s):  Ad Lin MD    Assistant:  * No surgical staff found *    Anesthesia: Monitor Anesthesia Care    Estimated Blood Loss (mL): Minimal    Complications: None    Specimens:   * No specimens in log *    Implants:  Implant Name Type Inv. Item Serial No.  Lot No. LRB No. Used Action   LENS CLAREON IOL 20.5D - [id number]  LENS CLAREON IOL 20.5D [id number] [employer]  Right 1 Implanted         Drains: * No LDAs found *    Findings:  Infection Present At Time Of Surgery (PATOS) (choose all levels that have infection present):  No infection present  Other Findings: N/A    Electronically signed by Ad Lin MD on 6/12/2025 at 10:20 AM

## 2025-06-13 ENCOUNTER — HOSPITAL ENCOUNTER (OUTPATIENT)
Age: 85
Discharge: HOME | End: 2025-06-13
Payer: MEDICARE

## 2025-06-13 DIAGNOSIS — E87.5 HYPERKALEMIA: ICD-10-CM

## 2025-06-13 PROCEDURE — 36415 COLL VENOUS BLD VENIPUNCTURE: CPT

## 2025-06-14 ENCOUNTER — HOSPITAL ENCOUNTER (OUTPATIENT)
Age: 85
Setting detail: SPECIMEN
Discharge: HOME | End: 2025-06-14
Payer: MEDICARE

## 2025-06-14 LAB
ANION GAP SERPL CALCULATED.3IONS-SCNC: 14 MMOL/L (ref 9–16)
BUN SERPL-MCNC: 23 MG/DL (ref 8–23)
CALCIUM SERPL-MCNC: 9.8 MG/DL (ref 8.6–10.4)
CHLORIDE SERPL-SCNC: 102 MMOL/L (ref 98–107)
CO2 SERPL-SCNC: 21 MMOL/L (ref 20–31)
CREAT SERPL-MCNC: 1.3 MG/DL (ref 0.7–1.2)
GFR, ESTIMATED: 55 ML/MIN/1.73M2
GLUCOSE SERPL-MCNC: 224 MG/DL (ref 74–99)
POTASSIUM SERPL-SCNC: 4.3 MMOL/L (ref 3.7–5.3)
SODIUM SERPL-SCNC: 137 MMOL/L (ref 136–145)

## 2025-06-14 PROCEDURE — 36415 COLL VENOUS BLD VENIPUNCTURE: CPT

## 2025-06-14 PROCEDURE — 80048 BASIC METABOLIC PNL TOTAL CA: CPT

## 2025-06-15 ENCOUNTER — RESULTS FOLLOW-UP (OUTPATIENT)
Dept: FAMILY MEDICINE CLINIC | Age: 85
End: 2025-06-15

## 2025-06-15 DIAGNOSIS — N18.9 CHRONIC KIDNEY DISEASE, UNSPECIFIED CKD STAGE: Primary | ICD-10-CM

## 2025-06-16 ENCOUNTER — CLINICAL SUPPORT (OUTPATIENT)
Dept: FAMILY MEDICINE CLINIC | Age: 85
End: 2025-06-16

## 2025-06-16 VITALS — SYSTOLIC BLOOD PRESSURE: 136 MMHG | HEART RATE: 57 BPM | DIASTOLIC BLOOD PRESSURE: 74 MMHG

## 2025-06-16 DIAGNOSIS — I10 ESSENTIAL HYPERTENSION: ICD-10-CM

## 2025-06-16 DIAGNOSIS — E11.9 TYPE 2 DIABETES MELLITUS WITHOUT COMPLICATION, WITHOUT LONG-TERM CURRENT USE OF INSULIN (HCC): Primary | ICD-10-CM

## 2025-06-16 DIAGNOSIS — N18.9 CHRONIC KIDNEY DISEASE, UNSPECIFIED CKD STAGE: ICD-10-CM

## 2025-06-16 LAB — HBA1C MFR BLD: 8 %

## 2025-06-16 RX ORDER — AMLODIPINE BESYLATE 5 MG/1
5 TABLET ORAL DAILY
Qty: 30 TABLET | Refills: 3 | Status: SHIPPED | OUTPATIENT
Start: 2025-06-16

## 2025-06-16 RX ORDER — LISINOPRIL 30 MG/1
30 TABLET ORAL EVERY MORNING
Qty: 90 TABLET | Refills: 0 | Status: SHIPPED | OUTPATIENT
Start: 2025-06-16 | End: 2025-09-14

## 2025-06-16 ASSESSMENT — ENCOUNTER SYMPTOMS
ABDOMINAL PAIN: 0
NAUSEA: 0
DIARRHEA: 0
CONSTIPATION: 0
COUGH: 0
ABDOMINAL DISTENTION: 0
WHEEZING: 0
SHORTNESS OF BREATH: 0
VOMITING: 0
BACK PAIN: 0
CHEST TIGHTNESS: 0

## 2025-06-16 NOTE — PROGRESS NOTES
Jon Fernandez (:  1940) is a 85 y.o. male, Established patient, here for evaluation of the following chief complaint(s):  Diabetes (A1c due)         Assessment & Plan  1. Diabetes.  - A1c level is currently at 8, consistent with previous readings of 7.6, 8.2, and 8.0.  - Dosage of metformin will be reduced to 500 mg twice daily from the current 1000 mg twice daily due to recent alteration in kidney function.  - Trulicity will be introduced into the regimen, starting with a low dose for the first month, followed by an increase in dosage for the subsequent month.  - Advised to monitor blood glucose levels and report any significant changes.    2. Hypertension.  - Blood pressure remains elevated despite being on lisinopril and atenolol.  -Blood pressure is controlled today in the office.  - Dosage of lisinopril will be reduced to 30 mg due to altered kidney function..  - Amlodipine 5 mg will be added to the regimen.  - Advised to monitor blood pressure at home for the next week and report back with the readings.  - Return to the office in 1 week for blood pressure check.    3. Chronic kidney disease.  - Currently on metformin and lisinopril, both of which are known to impact renal function.  - Referral to a nephrologist has been made.  - Kidney function will be reassessed in approximately one month.  - Adjustment to medication regimen has been made.  Close follow-up with nephrology recommended.    Follow-up  - The patient will follow up in 1 week.  For blood pressure check.    Lab Results   Component Value Date    LABA1C 8.0 2025    LABA1C 8.2 (H) 2025    LABA1C 7.6 2024     Results  Labs   - A1c: 2025, 8   - Hemoglobin: 2025, 13.8  1. Type 2 diabetes mellitus without complication, without long-term current use of insulin (HCC)  -Stable  -Plan to continue plan of care, decreasing the dose of metformin  -Nephrology recommendations appreciated  -Plan to add GLP-1,

## 2025-06-17 ENCOUNTER — PATIENT MESSAGE (OUTPATIENT)
Dept: FAMILY MEDICINE CLINIC | Age: 85
End: 2025-06-17

## 2025-06-19 ENCOUNTER — HOSPITAL ENCOUNTER (OUTPATIENT)
Age: 85
Discharge: HOME | End: 2025-06-19
Payer: MEDICARE

## 2025-06-19 DIAGNOSIS — I12.9 BENIGN HYPERTENSION WITH CKD (CHRONIC KIDNEY DISEASE) STAGE III (HCC): ICD-10-CM

## 2025-06-19 DIAGNOSIS — Z13.21 ENCOUNTER FOR VITAMIN DEFICIENCY SCREENING: ICD-10-CM

## 2025-06-19 DIAGNOSIS — N18.30 BENIGN HYPERTENSION WITH CKD (CHRONIC KIDNEY DISEASE) STAGE III (HCC): ICD-10-CM

## 2025-06-19 DIAGNOSIS — N18.30 SECONDARY DIABETES MELLITUS WITH STAGE 3 CHRONIC KIDNEY DISEASE (GFR 30-59) (HCC): ICD-10-CM

## 2025-06-19 DIAGNOSIS — E13.22 SECONDARY DIABETES MELLITUS WITH STAGE 3 CHRONIC KIDNEY DISEASE (GFR 30-59) (HCC): ICD-10-CM

## 2025-06-19 DIAGNOSIS — E55.9 VITAMIN D DEFICIENCY: ICD-10-CM

## 2025-06-19 DIAGNOSIS — N18.32 STAGE 3B CHRONIC KIDNEY DISEASE (HCC): ICD-10-CM

## 2025-06-19 PROBLEM — N18.2 CKD (CHRONIC KIDNEY DISEASE), STAGE II: Status: ACTIVE | Noted: 2025-06-19

## 2025-06-19 LAB
25(OH)D3 SERPL-MCNC: 45.3 NG/ML (ref 30–100)
ANION GAP SERPL CALCULATED.3IONS-SCNC: 13 MMOL/L (ref 9–16)
BACTERIA URNS QL MICRO: ABNORMAL
BILIRUB UR QL STRIP: NEGATIVE
BUN SERPL-MCNC: 20 MG/DL (ref 8–23)
CALCIUM SERPL-MCNC: 10.1 MG/DL (ref 8.6–10.4)
CASTS #/AREA URNS LPF: ABNORMAL /LPF
CHLORIDE SERPL-SCNC: 104 MMOL/L (ref 98–107)
CLARITY UR: CLEAR
CO2 SERPL-SCNC: 24 MMOL/L (ref 20–31)
COLOR UR: YELLOW
CREAT SERPL-MCNC: 1.1 MG/DL (ref 0.7–1.2)
CREAT UR-MCNC: 84.5 MG/DL (ref 39–259)
EPI CELLS #/AREA URNS HPF: ABNORMAL /HPF
GFR, ESTIMATED: 67 ML/MIN/1.73M2
GLUCOSE UR STRIP-MCNC: NEGATIVE MG/DL
HCT VFR BLD AUTO: 39.7 % (ref 41–53)
HGB BLD-MCNC: 13.7 G/DL (ref 13.5–17.5)
HGB UR QL STRIP.AUTO: NEGATIVE
KETONES UR STRIP-MCNC: NEGATIVE MG/DL
LEUKOCYTE ESTERASE UR QL STRIP: NEGATIVE
MAGNESIUM SERPL-MCNC: 1.8 MG/DL (ref 1.6–2.4)
MICROALBUMIN UR-MCNC: <12 MG/L (ref 0–20)
MICROALBUMIN/CREAT UR-RTO: NORMAL MCG/MG CREAT (ref 0–17)
NITRITE UR QL STRIP: NEGATIVE
PH UR STRIP: 5.5 [PH] (ref 5–8)
PHOSPHATE SERPL-MCNC: 3.5 MG/DL (ref 2.5–4.5)
POTASSIUM SERPL-SCNC: 4.5 MMOL/L (ref 3.7–5.3)
PROT UR STRIP-MCNC: NEGATIVE MG/DL
RBC #/AREA URNS HPF: ABNORMAL /HPF
SODIUM SERPL-SCNC: 141 MMOL/L (ref 136–145)
SP GR UR STRIP: 1.01 (ref 1–1.03)
UROBILINOGEN UR STRIP-ACNC: NORMAL EU/DL (ref 0–1)
WBC #/AREA URNS HPF: ABNORMAL /HPF

## 2025-06-19 PROCEDURE — 82570 ASSAY OF URINE CREATININE: CPT

## 2025-06-19 PROCEDURE — 80051 ELECTROLYTE PANEL: CPT

## 2025-06-19 PROCEDURE — 84100 ASSAY OF PHOSPHORUS: CPT

## 2025-06-19 PROCEDURE — 36415 COLL VENOUS BLD VENIPUNCTURE: CPT

## 2025-06-19 PROCEDURE — 85014 HEMATOCRIT: CPT

## 2025-06-19 PROCEDURE — 83735 ASSAY OF MAGNESIUM: CPT

## 2025-06-19 PROCEDURE — 82043 UR ALBUMIN QUANTITATIVE: CPT

## 2025-06-19 PROCEDURE — 82306 VITAMIN D 25 HYDROXY: CPT

## 2025-06-19 PROCEDURE — 85018 HEMOGLOBIN: CPT

## 2025-06-19 PROCEDURE — 82565 ASSAY OF CREATININE: CPT

## 2025-06-19 PROCEDURE — 84520 ASSAY OF UREA NITROGEN: CPT

## 2025-06-19 PROCEDURE — 81001 URINALYSIS AUTO W/SCOPE: CPT

## 2025-06-19 PROCEDURE — 82310 ASSAY OF CALCIUM: CPT

## 2025-06-27 ENCOUNTER — HOSPITAL ENCOUNTER (OUTPATIENT)
Age: 85
Setting detail: SPECIMEN
Discharge: HOME | End: 2025-06-27
Payer: MEDICARE

## 2025-06-27 DIAGNOSIS — N18.2 SECONDARY DIABETES MELLITUS WITH STAGE 2 CHRONIC KIDNEY DISEASE (HCC): ICD-10-CM

## 2025-06-27 DIAGNOSIS — E13.22 SECONDARY DIABETES MELLITUS WITH STAGE 2 CHRONIC KIDNEY DISEASE (HCC): ICD-10-CM

## 2025-06-27 DIAGNOSIS — E55.9 VITAMIN D DEFICIENCY: ICD-10-CM

## 2025-06-27 DIAGNOSIS — I12.9 BENIGN HYPERTENSION WITH CKD (CHRONIC KIDNEY DISEASE), STAGE II: ICD-10-CM

## 2025-06-27 DIAGNOSIS — N18.2 CKD (CHRONIC KIDNEY DISEASE), STAGE II: ICD-10-CM

## 2025-06-27 DIAGNOSIS — N18.2 BENIGN HYPERTENSION WITH CKD (CHRONIC KIDNEY DISEASE), STAGE II: ICD-10-CM

## 2025-06-27 LAB
25(OH)D3 SERPL-MCNC: 48.7 NG/ML (ref 30–100)
ANION GAP SERPL CALCULATED.3IONS-SCNC: 10 MMOL/L (ref 9–16)
BACTERIA URNS QL MICRO: ABNORMAL
BILIRUB UR QL STRIP: NEGATIVE
BUN SERPL-MCNC: 20 MG/DL (ref 8–23)
C3 SERPL-MCNC: 132 MG/DL (ref 90–180)
C4 SERPL-MCNC: 34 MG/DL (ref 10–40)
CALCIUM SERPL-MCNC: 10.1 MG/DL (ref 8.6–10.4)
CASTS #/AREA URNS LPF: ABNORMAL /LPF
CHLORIDE SERPL-SCNC: 105 MMOL/L (ref 98–107)
CLARITY UR: CLEAR
CO2 SERPL-SCNC: 25 MMOL/L (ref 20–31)
COLLECT DURATION TIME SPEC: 24 H
COLLECT DURATION TIME UR: 24 H
COLOR UR: YELLOW
CREAT SERPL-MCNC: 1.1 MG/DL (ref 0.7–1.2)
CREAT SERPL-MCNC: 1.1 MG/DL (ref 0.7–1.2)
CREAT UR-MCNC: 49.7 MG/DL (ref 1040–2350)
CREAT UR-MCNC: 53.1 MG/DL (ref 39–259)
CREATINE 24H UR-MRATE: 1255 MG/24 H (ref 1040–2350)
CREATININE CLEARANCE: 67.1 ML/MIN/BSA
EPI CELLS #/AREA URNS HPF: ABNORMAL /HPF
FREE KAPPA/LAMBDA RATIO: 1.14 (ref 0.22–1.74)
GFR, ESTIMATED: 67 ML/MIN/1.73M2
GLUCOSE UR STRIP-MCNC: NEGATIVE MG/DL
HCT VFR BLD AUTO: 39.3 % (ref 41–53)
HGB BLD-MCNC: 13.7 G/DL (ref 13.5–17.5)
HGB UR QL STRIP.AUTO: NEGATIVE
HOURS COLLECTED: 24 H
KAPPA LC FREE SER-MCNC: 22.9 MG/L
KETONES UR STRIP-MCNC: NEGATIVE MG/DL
LAMBDA LC FREE SERPL-MCNC: 20.1 MG/L (ref 4.2–27.7)
LEUKOCYTE ESTERASE UR QL STRIP: NEGATIVE
MAGNESIUM SERPL-MCNC: 1.7 MG/DL (ref 1.6–2.4)
MICROALBUMIN UR-MCNC: <12 MG/L (ref 0–20)
MICROALBUMIN/CREAT UR-RTO: NORMAL MCG/MG CREAT (ref 0–17)
NITRITE UR QL STRIP: NEGATIVE
PATIENT HEIGHT: 70 IN
PH UR STRIP: 6.5 [PH] (ref 5–8)
PHOSPHATE SERPL-MCNC: 3.2 MG/DL (ref 2.5–4.5)
POTASSIUM SERPL-SCNC: 5.7 MMOL/L (ref 3.7–5.3)
PROT UR STRIP-MCNC: NEGATIVE MG/DL
PROTEIN 24 HOUR URINE: <101 MG/24 H
RBC #/AREA URNS HPF: ABNORMAL /HPF
SODIUM 24 HOUR URINE: 114 MMOL/24 H (ref 40–220)
SODIUM SERPL-SCNC: 140 MMOL/L (ref 136–145)
SODIUM URINE: 45 MMOL/L
SP GR UR STRIP: 1.01 (ref 1–1.03)
SPECIMEN VOL 24H UR: 2525 ML
SPECIMEN VOL UR: 2525 ML
URINE TOTAL PROTEIN: <4 MG/DL
UROBILINOGEN UR STRIP-ACNC: NORMAL EU/DL (ref 0–1)
VOLUME: 2525 ML
WBC #/AREA URNS HPF: ABNORMAL /HPF

## 2025-06-27 PROCEDURE — 84520 ASSAY OF UREA NITROGEN: CPT

## 2025-06-27 PROCEDURE — 86160 COMPLEMENT ANTIGEN: CPT

## 2025-06-27 PROCEDURE — 82570 ASSAY OF URINE CREATININE: CPT

## 2025-06-27 PROCEDURE — 83521 IG LIGHT CHAINS FREE EACH: CPT

## 2025-06-27 PROCEDURE — 80051 ELECTROLYTE PANEL: CPT

## 2025-06-27 PROCEDURE — 85014 HEMATOCRIT: CPT

## 2025-06-27 PROCEDURE — 84300 ASSAY OF URINE SODIUM: CPT

## 2025-06-27 PROCEDURE — 84100 ASSAY OF PHOSPHORUS: CPT

## 2025-06-27 PROCEDURE — 82565 ASSAY OF CREATININE: CPT

## 2025-06-27 PROCEDURE — 86038 ANTINUCLEAR ANTIBODIES: CPT

## 2025-06-27 PROCEDURE — 82306 VITAMIN D 25 HYDROXY: CPT

## 2025-06-27 PROCEDURE — 82310 ASSAY OF CALCIUM: CPT

## 2025-06-27 PROCEDURE — 82575 CREATININE CLEARANCE TEST: CPT

## 2025-06-27 PROCEDURE — 83735 ASSAY OF MAGNESIUM: CPT

## 2025-06-27 PROCEDURE — 85018 HEMOGLOBIN: CPT

## 2025-06-27 PROCEDURE — 86225 DNA ANTIBODY NATIVE: CPT

## 2025-06-27 PROCEDURE — 81001 URINALYSIS AUTO W/SCOPE: CPT

## 2025-06-27 PROCEDURE — 36415 COLL VENOUS BLD VENIPUNCTURE: CPT

## 2025-06-27 PROCEDURE — 84156 ASSAY OF PROTEIN URINE: CPT

## 2025-06-27 PROCEDURE — 82043 UR ALBUMIN QUANTITATIVE: CPT

## 2025-06-27 PROCEDURE — 86162 COMPLEMENT TOTAL (CH50): CPT

## 2025-06-29 LAB — COMPLEMENT TOTAL (CH50): 86.9 U/ML (ref 38.7–89.9)

## 2025-06-30 LAB
ANA SER QL IA: NEGATIVE
DSDNA IGG SER QL IA: 3.6 IU/ML
NUCLEAR IGG SER IA-RTO: <0.1 U/ML

## 2025-07-01 ENCOUNTER — HOSPITAL ENCOUNTER (OUTPATIENT)
Dept: ULTRASOUND IMAGING | Age: 85
Discharge: HOME | End: 2025-07-03
Attending: INTERNAL MEDICINE
Payer: MEDICARE

## 2025-07-01 DIAGNOSIS — N18.2 BENIGN HYPERTENSION WITH CKD (CHRONIC KIDNEY DISEASE), STAGE II: ICD-10-CM

## 2025-07-01 DIAGNOSIS — E13.22 SECONDARY DIABETES MELLITUS WITH STAGE 2 CHRONIC KIDNEY DISEASE (HCC): ICD-10-CM

## 2025-07-01 DIAGNOSIS — E55.9 VITAMIN D DEFICIENCY: ICD-10-CM

## 2025-07-01 DIAGNOSIS — N18.2 CKD (CHRONIC KIDNEY DISEASE), STAGE II: ICD-10-CM

## 2025-07-01 DIAGNOSIS — I12.9 BENIGN HYPERTENSION WITH CKD (CHRONIC KIDNEY DISEASE), STAGE II: ICD-10-CM

## 2025-07-01 DIAGNOSIS — N18.2 SECONDARY DIABETES MELLITUS WITH STAGE 2 CHRONIC KIDNEY DISEASE (HCC): ICD-10-CM

## 2025-07-01 PROCEDURE — 76770 US EXAM ABDO BACK WALL COMP: CPT

## 2025-07-02 ENCOUNTER — CLINICAL SUPPORT (OUTPATIENT)
Dept: FAMILY MEDICINE CLINIC | Age: 85
End: 2025-07-02

## 2025-07-02 ENCOUNTER — HOSPITAL ENCOUNTER (OUTPATIENT)
Age: 85
Discharge: HOME | End: 2025-07-02
Payer: MEDICARE

## 2025-07-02 VITALS — DIASTOLIC BLOOD PRESSURE: 70 MMHG | OXYGEN SATURATION: 98 % | SYSTOLIC BLOOD PRESSURE: 120 MMHG | HEART RATE: 68 BPM

## 2025-07-02 DIAGNOSIS — N18.2 SECONDARY DIABETES MELLITUS WITH STAGE 2 CHRONIC KIDNEY DISEASE (HCC): ICD-10-CM

## 2025-07-02 DIAGNOSIS — I10 ESSENTIAL HYPERTENSION: Primary | ICD-10-CM

## 2025-07-02 DIAGNOSIS — E13.22 SECONDARY DIABETES MELLITUS WITH STAGE 2 CHRONIC KIDNEY DISEASE (HCC): ICD-10-CM

## 2025-07-02 DIAGNOSIS — E87.5 HYPERKALEMIA: ICD-10-CM

## 2025-07-02 DIAGNOSIS — I12.9 BENIGN HYPERTENSION WITH CKD (CHRONIC KIDNEY DISEASE), STAGE II: ICD-10-CM

## 2025-07-02 DIAGNOSIS — N18.2 BENIGN HYPERTENSION WITH CKD (CHRONIC KIDNEY DISEASE), STAGE II: ICD-10-CM

## 2025-07-02 DIAGNOSIS — N18.2 CKD (CHRONIC KIDNEY DISEASE), STAGE II: ICD-10-CM

## 2025-07-02 LAB — POTASSIUM SERPL-SCNC: 5.2 MMOL/L (ref 3.7–5.3)

## 2025-07-02 PROCEDURE — 84132 ASSAY OF SERUM POTASSIUM: CPT

## 2025-07-02 PROCEDURE — 36415 COLL VENOUS BLD VENIPUNCTURE: CPT

## 2025-07-02 NOTE — PROGRESS NOTES
Vitals:    07/02/25 0938   BP: 120/70   Pulse: 68   SpO2: 98%     Blood pressure looks good.  I did recommend continuing current plan of care.  Recent evaluation with nephrology indicating that okay to continue lisinopril but discontinue metformin if serum creatinine increases to greater than 1.5 mg/dL.  No change in plan of care for now.  Continue close follow-up with myself as well as nephrology.    Electronically signed by MADHU Nathan CNP on 7/2/2025 at 10:00 AM

## 2025-07-02 NOTE — PROGRESS NOTES
Chief Complaint   Patient presents with    Hypertension    Blood Pressure Check      Jon Fernandez is here for BP check    BP Readings from Last 3 Encounters:   07/02/25 120/70   06/24/25 (!) 143/73   06/16/25 136/74     120/70  BP is       Future Appointments   Date Time Provider Department Center   7/2/2025  9:45 AM SCHEDULE, [facility]   9/9/2025  3:00 PM Rayo Davis APRN - CNP fp [facility]

## 2025-07-08 ENCOUNTER — PATIENT MESSAGE (OUTPATIENT)
Dept: FAMILY MEDICINE CLINIC | Age: 85
End: 2025-07-08

## 2025-07-08 DIAGNOSIS — H91.93 BILATERAL HEARING LOSS, UNSPECIFIED HEARING LOSS TYPE: Primary | ICD-10-CM

## 2025-07-15 DIAGNOSIS — I10 ESSENTIAL HYPERTENSION: ICD-10-CM

## 2025-07-15 RX ORDER — ATENOLOL 50 MG/1
TABLET ORAL
Qty: 90 TABLET | Refills: 0 | Status: SHIPPED | OUTPATIENT
Start: 2025-07-15

## 2025-07-15 RX ORDER — ATORVASTATIN CALCIUM 10 MG/1
TABLET, FILM COATED ORAL
Qty: 90 TABLET | Refills: 0 | Status: SHIPPED | OUTPATIENT
Start: 2025-07-15

## 2025-07-15 NOTE — TELEPHONE ENCOUNTER
Please Approve or Refuse.  Send to Pharmacy per Pt's Request:      Next Visit Date:  9/9/2025   Last Visit Date: 7/2/2025    Hemoglobin A1C (%)   Date Value   06/16/2025 8.0   03/04/2025 8.2 (H)   12/04/2024 7.6             ( goal A1C is < 7)   BP Readings from Last 3 Encounters:   07/02/25 120/70   06/24/25 (!) 143/73   06/16/25 136/74          (goal 120/80)  BUN   Date Value Ref Range Status   06/27/2025 20 8 - 23 mg/dL Final     Creatinine   Date Value Ref Range Status   06/27/2025 1.1 0.7 - 1.2 mg/dL Final     Potassium   Date Value Ref Range Status   07/02/2025 5.2 3.7 - 5.3 mmol/L Final

## 2025-08-12 DIAGNOSIS — F41.9 ANXIETY: ICD-10-CM

## 2025-08-12 DIAGNOSIS — F33.0 MILD EPISODE OF RECURRENT MAJOR DEPRESSIVE DISORDER: ICD-10-CM

## 2025-08-14 RX ORDER — GLIMEPIRIDE 4 MG/1
4 TABLET ORAL
Qty: 90 TABLET | Refills: 0 | Status: SHIPPED | OUTPATIENT
Start: 2025-08-14

## 2025-08-26 RX ORDER — LISINOPRIL 40 MG/1
40 TABLET ORAL DAILY
Qty: 90 TABLET | Refills: 0 | Status: SHIPPED | OUTPATIENT
Start: 2025-08-26

## (undated) DEVICE — THE MONARCH® "D" CARTRIDGE IS A SINGLE-USE POLYPROPYLENE CARTRIDGE FOR POSTERIOR CHAMBER IOL DELIVERY: Brand: MONARCH® III

## (undated) DEVICE — GOWN,AURORA,NONREINFORCED,LARGE: Brand: MEDLINE

## (undated) DEVICE — SHIELD EYE W3XL2.5IN UNIV CLR PLAS LTWT

## (undated) DEVICE — PREMIUM DRY TRAY LF: Brand: MEDLINE INDUSTRIES, INC.

## (undated) DEVICE — TOWEL,OR,DSP,ST,WHITE,DLX,2/PK,40PK/CS: Brand: MEDLINE

## (undated) DEVICE — LABEL MED EYE STRL

## (undated) DEVICE — CANNULA OPHTH EYE HYDR DISECT

## (undated) DEVICE — GAUZE,SPONGE,4"X4",16PLY,XRAY,STRL,LF: Brand: MEDLINE

## (undated) DEVICE — SOLUTION IRRIG 500ML BAL SALT FLX BG BSS

## (undated) DEVICE — APPLICATOR COTTON TIP STRL 5/PK

## (undated) DEVICE — ACTIVE FMS W/ INTREPID* ULTRA SLEEVES, 0.9MM 30° ABS* INTREPID* BALANCED TIP: Brand: ALCON

## (undated) DEVICE — COVER,MAYO STAND,STERILE: Brand: MEDLINE

## (undated) DEVICE — SOLUTION PREP PAINT POV IOD FOR SKIN MUCOUS MEM

## (undated) DEVICE — GLOVE SURG SZ 65 CRM LTX FREE POLYISOPRENE POLYMER BEAD ANTI

## (undated) DEVICE — SURGICAL PROCEDURE PACK LT EYE CUST ST CHARLES STRL LF DISP